# Patient Record
Sex: FEMALE | Race: WHITE | NOT HISPANIC OR LATINO | Employment: PART TIME | URBAN - METROPOLITAN AREA
[De-identification: names, ages, dates, MRNs, and addresses within clinical notes are randomized per-mention and may not be internally consistent; named-entity substitution may affect disease eponyms.]

---

## 2018-09-17 ENCOUNTER — ANESTHESIA EVENT (OUTPATIENT)
Dept: SURGERY | Facility: CLINIC | Age: 48
End: 2018-09-17
Payer: COMMERCIAL

## 2018-09-17 ENCOUNTER — HOSPITAL ENCOUNTER (OUTPATIENT)
Facility: CLINIC | Age: 48
Discharge: HOME | End: 2018-09-17
Attending: ORTHOPAEDIC SURGERY | Admitting: ORTHOPAEDIC SURGERY
Payer: COMMERCIAL

## 2018-09-17 ENCOUNTER — ANESTHESIA (OUTPATIENT)
Dept: SURGERY | Facility: CLINIC | Age: 48
End: 2018-09-17
Payer: COMMERCIAL

## 2018-09-17 VITALS
SYSTOLIC BLOOD PRESSURE: 116 MMHG | DIASTOLIC BLOOD PRESSURE: 83 MMHG | BODY MASS INDEX: 36.71 KG/M2 | TEMPERATURE: 96.6 F | RESPIRATION RATE: 16 BRPM | OXYGEN SATURATION: 96 % | HEIGHT: 70 IN | WEIGHT: 256.4 LBS

## 2018-09-17 DIAGNOSIS — Z98.890 S/P FOOT SURGERY: Primary | ICD-10-CM

## 2018-09-17 LAB — HCG UR QL: NEGATIVE

## 2018-09-17 PROCEDURE — 71000012 ZZH RECOVERY PHASE 1 LEVEL 1 FIRST HR: Performed by: ORTHOPAEDIC SURGERY

## 2018-09-17 PROCEDURE — 25000128 H RX IP 250 OP 636: Performed by: ANESTHESIOLOGY

## 2018-09-17 PROCEDURE — 27210794 ZZH OR GENERAL SUPPLY STERILE: Performed by: ORTHOPAEDIC SURGERY

## 2018-09-17 PROCEDURE — 36000056 ZZH SURGERY LEVEL 3 1ST 30 MIN: Performed by: ORTHOPAEDIC SURGERY

## 2018-09-17 PROCEDURE — 27110028 ZZH OR GENERAL SUPPLY NON-STERILE: Performed by: ORTHOPAEDIC SURGERY

## 2018-09-17 PROCEDURE — 37000008 ZZH ANESTHESIA TECHNICAL FEE, 1ST 30 MIN: Performed by: ORTHOPAEDIC SURGERY

## 2018-09-17 PROCEDURE — 25000125 ZZHC RX 250: Performed by: NURSE ANESTHETIST, CERTIFIED REGISTERED

## 2018-09-17 PROCEDURE — 71000013 ZZH RECOVERY PHASE 1 LEVEL 1 EA ADDTL HR: Performed by: ORTHOPAEDIC SURGERY

## 2018-09-17 PROCEDURE — 25800025 ZZH RX 258: Performed by: ORTHOPAEDIC SURGERY

## 2018-09-17 PROCEDURE — C1713 ANCHOR/SCREW BN/BN,TIS/BN: HCPCS | Performed by: ORTHOPAEDIC SURGERY

## 2018-09-17 PROCEDURE — 25000128 H RX IP 250 OP 636: Performed by: NURSE ANESTHETIST, CERTIFIED REGISTERED

## 2018-09-17 PROCEDURE — 37000009 ZZH ANESTHESIA TECHNICAL FEE, EACH ADDTL 15 MIN: Performed by: ORTHOPAEDIC SURGERY

## 2018-09-17 PROCEDURE — 36000058 ZZH SURGERY LEVEL 3 EA 15 ADDTL MIN: Performed by: ORTHOPAEDIC SURGERY

## 2018-09-17 PROCEDURE — 25000566 ZZH SEVOFLURANE, EA 15 MIN: Performed by: ORTHOPAEDIC SURGERY

## 2018-09-17 PROCEDURE — 71000027 ZZH RECOVERY PHASE 2 EACH 15 MINS: Performed by: ORTHOPAEDIC SURGERY

## 2018-09-17 PROCEDURE — 25000128 H RX IP 250 OP 636: Performed by: PHYSICIAN ASSISTANT

## 2018-09-17 PROCEDURE — 40000170 ZZH STATISTIC PRE-PROCEDURE ASSESSMENT II: Performed by: ORTHOPAEDIC SURGERY

## 2018-09-17 PROCEDURE — 25000132 ZZH RX MED GY IP 250 OP 250 PS 637: Performed by: PHYSICIAN ASSISTANT

## 2018-09-17 PROCEDURE — 25000125 ZZHC RX 250: Performed by: ORTHOPAEDIC SURGERY

## 2018-09-17 PROCEDURE — 25000128 H RX IP 250 OP 636: Performed by: ORTHOPAEDIC SURGERY

## 2018-09-17 PROCEDURE — 81025 URINE PREGNANCY TEST: CPT | Performed by: ANESTHESIOLOGY

## 2018-09-17 DEVICE — IMP KIT REPAIR LIGAMENT AUGMENTATION INT BRACE AR-1678-CP: Type: IMPLANTABLE DEVICE | Site: ANKLE | Status: FUNCTIONAL

## 2018-09-17 RX ORDER — ONDANSETRON 4 MG/1
4 TABLET, ORALLY DISINTEGRATING ORAL
Status: DISCONTINUED | OUTPATIENT
Start: 2018-09-17 | End: 2018-09-17 | Stop reason: HOSPADM

## 2018-09-17 RX ORDER — FENTANYL CITRATE 50 UG/ML
INJECTION, SOLUTION INTRAMUSCULAR; INTRAVENOUS PRN
Status: DISCONTINUED | OUTPATIENT
Start: 2018-09-17 | End: 2018-09-17

## 2018-09-17 RX ORDER — FENTANYL CITRATE 50 UG/ML
25-50 INJECTION, SOLUTION INTRAMUSCULAR; INTRAVENOUS
Status: DISCONTINUED | OUTPATIENT
Start: 2018-09-17 | End: 2018-09-17 | Stop reason: HOSPADM

## 2018-09-17 RX ORDER — NALOXONE HYDROCHLORIDE 0.4 MG/ML
.1-.4 INJECTION, SOLUTION INTRAMUSCULAR; INTRAVENOUS; SUBCUTANEOUS
Status: DISCONTINUED | OUTPATIENT
Start: 2018-09-17 | End: 2018-09-17 | Stop reason: HOSPADM

## 2018-09-17 RX ORDER — ROPIVACAINE HYDROCHLORIDE 5 MG/ML
INJECTION, SOLUTION EPIDURAL; INFILTRATION; PERINEURAL PRN
Status: DISCONTINUED | OUTPATIENT
Start: 2018-09-17 | End: 2018-09-17 | Stop reason: HOSPADM

## 2018-09-17 RX ORDER — MEPERIDINE HYDROCHLORIDE 25 MG/ML
12.5 INJECTION INTRAMUSCULAR; INTRAVENOUS; SUBCUTANEOUS
Status: DISCONTINUED | OUTPATIENT
Start: 2018-09-17 | End: 2018-09-17 | Stop reason: HOSPADM

## 2018-09-17 RX ORDER — PROPOFOL 10 MG/ML
INJECTION, EMULSION INTRAVENOUS PRN
Status: DISCONTINUED | OUTPATIENT
Start: 2018-09-17 | End: 2018-09-17

## 2018-09-17 RX ORDER — AMOXICILLIN 250 MG
1-2 CAPSULE ORAL 2 TIMES DAILY
Qty: 30 TABLET | Refills: 0 | Status: SHIPPED | OUTPATIENT
Start: 2018-09-17 | End: 2022-09-26

## 2018-09-17 RX ORDER — OXYCODONE AND ACETAMINOPHEN 5; 325 MG/1; MG/1
1 TABLET ORAL
Status: COMPLETED | OUTPATIENT
Start: 2018-09-17 | End: 2018-09-17

## 2018-09-17 RX ORDER — OXYCODONE AND ACETAMINOPHEN 5; 325 MG/1; MG/1
1-2 TABLET ORAL EVERY 4 HOURS PRN
Qty: 20 TABLET | Refills: 0 | Status: SHIPPED | OUTPATIENT
Start: 2018-09-17 | End: 2022-09-26

## 2018-09-17 RX ORDER — ONDANSETRON 4 MG/1
4 TABLET, ORALLY DISINTEGRATING ORAL EVERY 30 MIN PRN
Status: DISCONTINUED | OUTPATIENT
Start: 2018-09-17 | End: 2018-09-17 | Stop reason: HOSPADM

## 2018-09-17 RX ORDER — PROPOFOL 10 MG/ML
INJECTION, EMULSION INTRAVENOUS CONTINUOUS PRN
Status: DISCONTINUED | OUTPATIENT
Start: 2018-09-17 | End: 2018-09-17

## 2018-09-17 RX ORDER — CEFAZOLIN SODIUM 2 G/100ML
2 INJECTION, SOLUTION INTRAVENOUS
Status: COMPLETED | OUTPATIENT
Start: 2018-09-17 | End: 2018-09-17

## 2018-09-17 RX ORDER — SODIUM CHLORIDE, SODIUM LACTATE, POTASSIUM CHLORIDE, CALCIUM CHLORIDE 600; 310; 30; 20 MG/100ML; MG/100ML; MG/100ML; MG/100ML
INJECTION, SOLUTION INTRAVENOUS CONTINUOUS PRN
Status: DISCONTINUED | OUTPATIENT
Start: 2018-09-17 | End: 2018-09-17

## 2018-09-17 RX ORDER — LIDOCAINE HYDROCHLORIDE 20 MG/ML
INJECTION, SOLUTION INFILTRATION; PERINEURAL PRN
Status: DISCONTINUED | OUTPATIENT
Start: 2018-09-17 | End: 2018-09-17

## 2018-09-17 RX ORDER — SODIUM CHLORIDE, SODIUM LACTATE, POTASSIUM CHLORIDE, CALCIUM CHLORIDE 600; 310; 30; 20 MG/100ML; MG/100ML; MG/100ML; MG/100ML
INJECTION, SOLUTION INTRAVENOUS CONTINUOUS
Status: DISCONTINUED | OUTPATIENT
Start: 2018-09-17 | End: 2018-09-17 | Stop reason: HOSPADM

## 2018-09-17 RX ORDER — IBUPROFEN 600 MG/1
600 TABLET, FILM COATED ORAL EVERY 6 HOURS PRN
Qty: 30 TABLET | Refills: 0 | Status: SHIPPED | OUTPATIENT
Start: 2018-09-17 | End: 2022-09-26

## 2018-09-17 RX ORDER — ONDANSETRON 4 MG/1
4-8 TABLET, ORALLY DISINTEGRATING ORAL EVERY 6 HOURS PRN
Qty: 12 TABLET | Refills: 1 | Status: SHIPPED | OUTPATIENT
Start: 2018-09-17 | End: 2022-09-26

## 2018-09-17 RX ORDER — ONDANSETRON 2 MG/ML
INJECTION INTRAMUSCULAR; INTRAVENOUS PRN
Status: DISCONTINUED | OUTPATIENT
Start: 2018-09-17 | End: 2018-09-17

## 2018-09-17 RX ORDER — HYDROMORPHONE HYDROCHLORIDE 1 MG/ML
.3-.5 INJECTION, SOLUTION INTRAMUSCULAR; INTRAVENOUS; SUBCUTANEOUS EVERY 10 MIN PRN
Status: DISCONTINUED | OUTPATIENT
Start: 2018-09-17 | End: 2018-09-17 | Stop reason: HOSPADM

## 2018-09-17 RX ORDER — ONDANSETRON 2 MG/ML
4 INJECTION INTRAMUSCULAR; INTRAVENOUS EVERY 30 MIN PRN
Status: DISCONTINUED | OUTPATIENT
Start: 2018-09-17 | End: 2018-09-17 | Stop reason: HOSPADM

## 2018-09-17 RX ORDER — HYDROXYZINE HYDROCHLORIDE 25 MG/1
25 TABLET, FILM COATED ORAL EVERY 6 HOURS PRN
Qty: 30 TABLET | Refills: 0 | Status: SHIPPED | OUTPATIENT
Start: 2018-09-17 | End: 2022-09-26

## 2018-09-17 RX ORDER — CEFAZOLIN SODIUM 1 G/3ML
1 INJECTION, POWDER, FOR SOLUTION INTRAMUSCULAR; INTRAVENOUS SEE ADMIN INSTRUCTIONS
Status: DISCONTINUED | OUTPATIENT
Start: 2018-09-17 | End: 2018-09-17 | Stop reason: HOSPADM

## 2018-09-17 RX ORDER — MAGNESIUM HYDROXIDE 1200 MG/15ML
LIQUID ORAL PRN
Status: DISCONTINUED | OUTPATIENT
Start: 2018-09-17 | End: 2018-09-17 | Stop reason: HOSPADM

## 2018-09-17 RX ORDER — KETOROLAC TROMETHAMINE 30 MG/ML
INJECTION, SOLUTION INTRAMUSCULAR; INTRAVENOUS PRN
Status: DISCONTINUED | OUTPATIENT
Start: 2018-09-17 | End: 2018-09-17

## 2018-09-17 RX ORDER — DEXAMETHASONE SODIUM PHOSPHATE 4 MG/ML
INJECTION, SOLUTION INTRA-ARTICULAR; INTRALESIONAL; INTRAMUSCULAR; INTRAVENOUS; SOFT TISSUE PRN
Status: DISCONTINUED | OUTPATIENT
Start: 2018-09-17 | End: 2018-09-17

## 2018-09-17 RX ADMIN — FENTANYL CITRATE 50 MCG: 50 INJECTION, SOLUTION INTRAMUSCULAR; INTRAVENOUS at 13:24

## 2018-09-17 RX ADMIN — FENTANYL CITRATE 50 MCG: 50 INJECTION, SOLUTION INTRAMUSCULAR; INTRAVENOUS at 13:16

## 2018-09-17 RX ADMIN — CEFAZOLIN SODIUM 2 G: 2 INJECTION, SOLUTION INTRAVENOUS at 13:20

## 2018-09-17 RX ADMIN — PHENYLEPHRINE HYDROCHLORIDE 100 MCG: 10 INJECTION, SOLUTION INTRAMUSCULAR; INTRAVENOUS; SUBCUTANEOUS at 13:22

## 2018-09-17 RX ADMIN — FENTANYL CITRATE 50 MCG: 50 INJECTION INTRAMUSCULAR; INTRAVENOUS at 14:20

## 2018-09-17 RX ADMIN — PHENYLEPHRINE HYDROCHLORIDE 100 MCG: 10 INJECTION, SOLUTION INTRAMUSCULAR; INTRAVENOUS; SUBCUTANEOUS at 13:34

## 2018-09-17 RX ADMIN — FENTANYL CITRATE 50 MCG: 50 INJECTION INTRAMUSCULAR; INTRAVENOUS at 14:44

## 2018-09-17 RX ADMIN — PROPOFOL 150 MCG/KG/MIN: 10 INJECTION, EMULSION INTRAVENOUS at 13:18

## 2018-09-17 RX ADMIN — ONDANSETRON 4 MG: 2 INJECTION INTRAMUSCULAR; INTRAVENOUS at 13:26

## 2018-09-17 RX ADMIN — LIDOCAINE HYDROCHLORIDE 100 MG: 20 INJECTION, SOLUTION INFILTRATION; PERINEURAL at 13:16

## 2018-09-17 RX ADMIN — PROPOFOL 200 MG: 10 INJECTION, EMULSION INTRAVENOUS at 13:16

## 2018-09-17 RX ADMIN — KETOROLAC TROMETHAMINE 30 MG: 30 INJECTION, SOLUTION INTRAMUSCULAR at 13:48

## 2018-09-17 RX ADMIN — OXYCODONE HYDROCHLORIDE AND ACETAMINOPHEN 1 TABLET: 5; 325 TABLET ORAL at 15:14

## 2018-09-17 RX ADMIN — FENTANYL CITRATE 50 MCG: 50 INJECTION INTRAMUSCULAR; INTRAVENOUS at 14:11

## 2018-09-17 RX ADMIN — DEXAMETHASONE SODIUM PHOSPHATE 4 MG: 4 INJECTION, SOLUTION INTRA-ARTICULAR; INTRALESIONAL; INTRAMUSCULAR; INTRAVENOUS; SOFT TISSUE at 13:25

## 2018-09-17 RX ADMIN — SODIUM CHLORIDE, POTASSIUM CHLORIDE, SODIUM LACTATE AND CALCIUM CHLORIDE: 600; 310; 30; 20 INJECTION, SOLUTION INTRAVENOUS at 13:15

## 2018-09-17 RX ADMIN — MIDAZOLAM 2 MG: 1 INJECTION INTRAMUSCULAR; INTRAVENOUS at 13:15

## 2018-09-17 ASSESSMENT — ENCOUNTER SYMPTOMS: DYSRHYTHMIAS: 1

## 2018-09-17 NOTE — ANESTHESIA PREPROCEDURE EVALUATION
Anesthesia Evaluation     . Pt has had prior anesthetic.     History of anesthetic complications    succinylcholine as a teen--very prolonged wakeup      ROS/MED HX    ENT/Pulmonary:      (-) asthma and sleep apnea   Neurologic:     (+)migraines,     Cardiovascular:     (+) ----. : . . . :. dysrhythmias (ablation for a fib one year ago) a-fib, .      (-) dyslipidemia   METS/Exercise Tolerance:     Hematologic:         Musculoskeletal:         GI/Hepatic:        (-) GERD   Renal/Genitourinary:         Endo:     (+) Obesity, .      Psychiatric:         Infectious Disease:         Malignancy:         Other:                     Physical Exam  Normal systems: pulmonary    Airway   Mallampati: II  TM distance: >3 FB  Neck ROM: full    Dental   Comment: native    Cardiovascular   Rhythm and rate: regular and normal      Pulmonary                     Anesthesia Plan      History & Physical Review  History and physical reviewed and following examination; no interval change.    ASA Status:  2 .    NPO Status:  > 8 hours    Plan for General and LMA with Propofol induction. Maintenance will be Balanced.    PONV prophylaxis:  Ondansetron (or other 5HT-3) and Dexamethasone or Solumedrol       Postoperative Care      Consents  Anesthetic plan, risks, benefits and alternatives discussed with:  Patient..                          .

## 2018-09-17 NOTE — IP AVS SNAPSHOT
[facility]    [address]    [place] [address]    Phone:  [phone]    Fax:  [phone]                                       After Visit Summary   9/17/2018    Farideh Sr    MRN: [id number]           After Visit Summary Signature Page     I have received my discharge instructions, and my questions have been answered. I have discussed any challenges I see with this plan with the nurse or doctor.    ..........................................................................................................................................  Patient/Patient Representative Signature      ..........................................................................................................................................  Patient Representative Print Name and Relationship to Patient    ..................................................               ................................................  Date                                   Time    ..........................................................................................................................................  Reviewed by Signature/Title    ...................................................              ..............................................  Date                                               Time          22EPIC Rev 08/18

## 2018-09-17 NOTE — ANESTHESIA POSTPROCEDURE EVALUATION
Patient: Farideh Sr    Procedure(s):  RIGHT ANKLE ARTHROSCOPY DEBRIDEMENT AND OPEN BROSTROM AND INTERNAL BRACE  - Wound Class: I-Clean    Diagnosis:RIGHT ANKLE INSTABILITY  Diagnosis Additional Information: No value filed.    Anesthesia Type:  General, LMA    Note:  Anesthesia Post Evaluation    Patient location during evaluation: PACU  Patient participation: Able to fully participate in evaluation  Level of consciousness: awake  Pain management: adequate  Cardiovascular status: acceptable  Respiratory status: acceptable  Hydration status: acceptable  PONV: none             Last vitals:  Vitals:    09/17/18 1407 09/17/18 1414 09/17/18 1415   BP: 127/60 127/60 119/58   Resp: 8 15 12   Temp: 35.8  C (96.5  F) 35.9  C (96.6  F) 35.9  C (96.6  F)   SpO2: 97% 97% 99%         Electronically Signed By: AMPARO ADAM  September 17, 2018  2:52 PM

## 2018-09-17 NOTE — ANESTHESIA CARE TRANSFER NOTE
Patient: Farideh Sr    Procedure(s):  RIGHT ANKLE ARTHROSCOPY DEBRIDEMENT AND OPEN BROSTROM AND INTERNAL BRACE  - Wound Class: I-Clean    Diagnosis: RIGHT ANKLE INSTABILITY  Diagnosis Additional Information: No value filed.    Anesthesia Type:   General, LMA     Note:  Airway :Nasal Cannula  Patient transferred to:PACU  Comments: Pt exhibits spontaneous respirations, follows commands, suctioned, LMA removed, exchanging well, transferred to pacu with O2 @ 2L via NC, all monitors and alarms on, VSS, patent IV, report and transfer of care to RN.  Handoff Report: Identifed the Patient, Identified the Reponsible Provider, Reviewed the pertinent medical history, Discussed the surgical course, Reviewed Intra-OP anesthesia mangement and issues during anesthesia, Set expectations for post-procedure period and Allowed opportunity for questions and acknowledgement of understanding      Vitals: (Last set prior to Anesthesia Care Transfer)    CRNA VITALS  9/17/2018 1331 - 9/17/2018 1408      9/17/2018             Pulse: 89    SpO2: 99 %    Resp Rate (observed): (!)  6    Resp Rate (set): 10                Electronically Signed By: JOSE MIGUEL Dobson CRNA  September 17, 2018  2:08 PM

## 2018-09-17 NOTE — OP NOTE
Procedure Date: 09/17/2018      PREOPERATIVE DIAGNOSES:     1.  Chronic instability of the right ankle.   2.  Osteochondral injury, right ankle, secondary to the chronic instability.       PROCEDURE PERFORMED:    1. Arthroscopy and Arthroscopic debridement of the right ankle including removal bone spurs  2. Brostrom Lateral ligament reconstruction augmented with an Internal Brace     ANESTHESIA:  General.       SURGEON:  Tita Larios MD      ASSISTANT:  TI Kelley      PREAMBLE:  Ms. Sr presented with longstanding issues with right ankle instability.  She tried conservative management to no avail.  She has increasing degenerative changes of her ankle due to the instability.      Informed consent was obtained for above-mentioned procedure.      Two grams of Ancef was given prior to surgery.  There is no need for postoperative antibiotic prophylaxis.      DESCRIPTION OF PROCEDURE:  After adequate induction of a general anesthetic, the patient was positioned supine on the operating table.  The right leg was sterilely prepped and free draped in the usual fashion.  Tourniquet around the thigh was inflated to 300 mmHg.        A standard arthroscopy was done with medial and lateral portals.  The scope was first inserted through the medial portal.  There was a significant articular cartilage injury in her ankle, especially over the lateral talar dome due to the chronic instability.  She has osteophytes anterior to the ankle.      The osteophytes were removed.  Debridement was done using a motorized shaver.      Due to the lateral instability, there was loss of articular cartilage over the medial malleolus and medial side of the talus.  There were loose bodies in the deltoid ligament that were removed.      After an adequate debridement, the scope was removed.  This was followed by a 5 cm curvilinear incision lateral over the ankle.  The lateral ligament complex was exposed.  A Brostrom type repair was  done, shortening and imbricating the anterior talofibular and calcaneofibular ligaments.      The repair was augmented with an internal brace with SwiveLock anchors at the footprints of the ATF and the talus and the fibula.      A stable repair was obtained.  The tourniquet was deflated.  Hemostasis obtained.  The wounds closed in layers.  A sterile dressing and a light compressive bandage applied, followed by a short leg cast.  She tolerated the procedure well and was taken to the recovery room in satisfactory condition.        She can ambulate weightbearing as tolerated.  Sutures will be removed in 2 weeks.         COREEN MELTON MD             D: 2018   T: 2018   MT: VERONICA      Name:     DEVEN SUAREZ   MRN:      [id number]        Account:        [id number]   :      1970           Procedure Date: 2018      Document: [id number]

## 2018-09-17 NOTE — DISCHARGE INSTRUCTIONS
POST-OPERATIVE INSTRUCTIONS  FOOT AND ANKLE SURGERY  LANDRY Larios M.D.  Felipe Daniel P.A.-C    These precautions MUST be followed for the first 24 hours after surgery:    Upon discharge, go directly home.    You must make arrangements to have a responsible adult stay with you.    DO NOT DRINK ALCOHOLIC BEVERAGES    It is not unusual to feel lightheaded up to 24 hours after surgery or while taking pain medication.  If you feel lightheaded, sit for a few minutes before standing and have someone assisted you when you get up to walk or use the restroom.    Do not use any mechanical equipment.    Do not make any important or legal decisions for 24 hours or while on pain medications.    You may experience dry mouth, sore throat, or sleep disturbances from the anesthesia and medications used during surgery.  Generalized muscle aches can sometimes occur.  These usually disappear in 12-24 hours.    POST-OPERATIVE CARE GUIDELINES    The following are general guidelines about what to expect the first days/weeks after surgery.  They are not specific, and your recovery may be slightly different.    Blood clots are not common, but are emergencies.  If you have sudden onset pain in your calf or leg, or have sudden shortness of breath, go to the Emergency Department.      Elevation of your operative foot/ankle is key to reducing the swelling in the immediate post-operative phase (first 3-5 days).  When you are at rest, elevate your foot at or above the level of your heart.  When sitting, your foot should be elevated on a chair or stool; not hanging down.      You should plan to rest the day after surgery no matter how minor the procedure.  More complicated procedures will require more time to return to normal activity.      Foot and ankle surgery is painful in most cases - use your medication as directed for the first 24 hours after surgery.  It is not unusual for the pain to be worse a day or two after surgery than on the  day of.  If your pain is more than 8/10 contact our office.  If you don t have pain, gradually decrease your pain meds by substituting Tylenol.  Please don t use Advil/ibuprofen unless we order it for you.      You will be prescribed Percocet or Vicodin for pain, Vistaril for spasms/pain adjunct, Senokot for constipation.  If you have had trouble taking these meds before or experience nausea or vomiting after surgery from your medication, please advise the recovery room nurses.  If you are already at home, try drinking only clear liquids and/or call our office.      All pain medications, along with inactivity can cause constipation.  Use the Senakot as directed, increase fluid intake to 1 quart per day and increase your dietary fiber.  (The  P  fruits - peaches, plums, pears, and prunes as well as anise/black licorice are recommended.)    It is not unusual to run a low-grade fever after surgery.  If your temperature is elevated above 102 , lasts longer than 24 hours, or is questionable in any way, contact our office.      Drainage from your cast/dressing is normal.  Reinforce your cast/dressing with 4x4 dressings and cover with an Ace wrap.  Wait until 24 hours after surgery to change your dressings; by this time most of your bleeding will have stopped.  If you have drainage through your dressings 2 days after surgery, contact our office.      You cast/dressing will be changed at your 2-week follow up appointment.  They should be kept clean and DRY.  If your cast/dressing is damaged before that, contact our office.      It is normal to experience some bruising in the toes after surgery and they may appear  dark  when your foot hangs down.  It is important to actively wiggle your toes for at least 5-10 minutes each hour UNLESS you had surgery on those toes.      Use ice on your foot/ankle over the dressing/cast for 20 minutes per hour, 10 or more times per day.  A large bag of frozen peas works well.      Bathing:  take a tub or sponge bath instead of a shower if possible during the first two weeks.  If you choose to shower, cover the dressing/cast with a waterproof covering, these may be ordered from www.seal-tight.com or are available at some pharmacies/medical supply stores.  Another option is XeroSox, which is the original vacuum sealed bandage and cast cover.  The cast cover has a vacuum seal and is absolutely waterproof.  1-760.952.4182 or www.xerosox.com for more information.      Driving:  For surgery on the left foot/ankle, you may drive as soon as narcotic medications are stopped.  For surgery on the right foot/ankle, you may drive when you are out of a cast, off pain medications, and you feel secure with braking.      In general, listen to your foot/ankle.  If you have a sudden, dramatic increase in pain that does not resolve after an hour or so, something is wrong - call our office.  If you fall or bump your foot/ankle and have sudden pain that resolves, give it 24 hours before you call.      Many of your questions can be addressed at your 2-week follow-up appointment - please make a list of things to ask us as they come up during your recovery.      If you had a nerve block it is common to have numbness in your leg and foot for up to 30 hours after surgery.  If your leg or foot is still numb more than 30 hours after surgery, please call the office.      FUTURE DENTIST VISITS:  If you have had a total ankle arthroplasty please be advised you must be on antibiotics prior to ANY dental work.  Please call your dentist office ahead of time and make them aware of this as your dentist will be able to order the prescription.  If you have had any other surgery this is not a concern.    If you have any further questions or concerns, please call our office at [phone]      Same Day Surgery Discharge Instructions for  Sedation and General Anesthesia       It's not unusual to feel dizzy, light-headed or faint for up to  24 hours after surgery or while taking pain medication.  If you have these symptoms: sit for a few minutes before standing and have someone assist you when you get up to walk or use the bathroom.      You should rest and relax for the next 24 hours. We recommend you make arrangements to have an adult stay with you for at least 24 hours after your discharge.  Avoid hazardous and strenuous activity.      DO NOT DRIVE any vehicle or operate mechanical equipment for 24 hours following the end of your surgery.  Even though you may feel normal, your reactions may be affected by the medication you have received.      Do not drink alcoholic beverages for 24 hours following surgery.       Slowly progress to your regular diet as you feel able. It's not unusual to feel nauseated and/or vomit after receiving anesthesia.  If you develop these symptoms, drink clear liquids (apple juice, ginger ale, broth, 7-up, etc. ) until you feel better.  If your nausea and vomiting persists for 24 hours, please notify your surgeon.        All narcotic pain medications, along with inactivity and anesthesia, can cause constipation. Drinking plenty of liquids and increasing fiber intake will help.      For any questions of a medical nature, call your surgeon.      Do not make important decisions for 24 hours.      If you had general anesthesia, you may have a sore throat for a couple of days related to the breathing tube used during surgery.  You may use Cepacol lozenges to help with this discomfort.  If it worsens or if you develop a fever, contact your surgeon.       If you feel your pain is not well managed with the pain medications prescribed by your surgeon, please contact your surgeon's office to let them know so they can address your concerns.     Crutch Instructions      Because of your surgery you will need crutches.  Make sure you tell your healthcare provider if crutches do not seem to work for you.  Using Crutches   Crutches are the  "most commonly used device for injuries to the lower part of the body because they allow the most mobility. All walking assistance devices require strength in your upper body but crutches require more coordination. If you are unable to use crutches then a cane or walker may be better.   Remember these rules when using crutches:   Always look straight ahead when you walk. Do not look down.   Hold the top padded part of the crutches into your chest near your armpits. Grasp the padded hand  and always support your weight with your arms and hands.   Do not lean on the crutches with your armpit as this could cause nerve damage.  Standing Up  Make sure you are in a stable chair. Move forward to the edge of the chair so that your  good  foot is flat on the floor. Put both crutches together and hold the handgrips in one hand. Stretch the injured leg out straight and then use the crutches with one hand and the chair armrest with the other hand to push yourself into a standing position onto your  good  leg. Once you are standing, wait until you are steady before placing a crutch in each hand. Until you become good at standing, have someone assist you in case you lose your balance. When standing still, lean slightly forward with the crutches ahead of you and about 3 feet apart. Unless you are told otherwise, you should keep weight off your injured leg.  Walking  To begin crutch walking, balance yourself on your  good  leg. Move both crutches forward about the length of one step and place them firmly on the floor in front of you about 3 feet apart. Support your weight on the padded hand rests while leaning forward. Press the top of the crutches against your chest wall and not into your armpits. Be sure to hold the injured leg up off of the floor. When ready, swing the \"good\" leg forward about one step length past the crutches while supporting your weight on the padded hand . Be careful not to go too far. Transfer your " "weight onto the \"good\" leg then bring both crutches forward about the length of one step. Repeating this motion will allow you to move fairly quickly without the use of your injured leg. Keep practicing until you become good at crutch walking.  Sitting Down  Make sure the chair you are about to sit on is stable. Walk in front of the chair such that you are facing away from it. Move back a little at a time until the back of your  good  leg is nearly touching the seat. Keeping your weight on the  good  leg, move both crutches to one hand holding onto the handgrips. Lean forward, bend your  good  knee, and move your injured leg out forward. Sit down slowly while using your free hand to reach for the chair s armrest for support. Place the crutches where you can easily get them. Never pivot, even on your  good  leg. This could cause you to fall or injure your  good  leg.  Navigating Stairs, Curbs & Door Steps  Only attempt this once you are very comfortable with regular crutch walking and only when someone is there to steady you should you begin to lose your balance. Hold on to a  railing with one hand and both crutches in the other hand or have someone carry the loose crutch for you. It does not matter which side the handrail is on. If there is no handrail, you can use both crutches. Using only crutches is the same as the railing method but maintaining your balance can be difficult. The crutch-only method is not recommended until you have become very good at crutch walking. Be sure to only take one step at a time. A simple rule to remember for crutches when navigating stairs or curbs: up with the  good  leg and down with the  bad .  Going Up Stairs or Curbs  Walk close to the first step with the crutches slightly behind you. Push down on the handrail and the crutch and step up with the \"good\" leg. You may have to make a short hop to do this if you are not allowed to place any weight on the injured leg. Lean " "forward and bring the injured leg and the crutch up beside the \"good\" leg. Repeat this until you have climbed up all of the steps. Remember, the \"good\" leg goes up first and the crutches move with the injured leg. The person helping you should stand behind and to your side that is away from the railing.  Going Down Stairs or Curbs  Walk to the edge of the first step. While standing and balancing on the  good  leg, place both crutches in one hand and then down on the step below. Be sure to support your weight by leaning on the crutches and handrail. Bring the injured leg forward, then the \"good\" leg down to the same step as the crutches. Remember crutches go down first with the injured leg. The person helping you should  front and to your side that is away from the railing.  Sitting Method for Navigating Stairs  A safer way to get up and down stairs is to sit down. To go up steps, sit down on the second or third step. Push with your  good  leg below while pushing up with both arms on the step above to move your bottom to the next step. When you get to the top of the stairs you may need to use the  scooting  method described later to get back up. To go down steps you first need to lower yourself to the floor near the top of the steps. Once seated, support yourself with your  good  leg on the second to next step below, and push with both arms on the step where you are sitting to move your bottom down to the next step. When you reach the bottom, pull yourself up to standing position using your crutches. It is helpful if someone can move your crutches and assist you in getting up and down. With practice it may be possible to manage on your own.  If You Start To Fall  If you start to fall and cannot get your balance, throw the crutches away from you. Try to fall onto your  good  side away from your injury and then break your fall with your arms. If uninjured, you can usually get back up by moving into a sitting " "position and scooting to a chair. Push with your arms and hands on the chair seat while pushing with the \"good\" leg to get up into the chair. You should not attempt to get back up if you are having significant pain. Call for assistance or dial 911 for an ambulance if necessary.  Safety Tips for Crutch Walking   At first you may want to wear a training belt (or a strong regular belt) so others can assist you.   Do not use crutches if you feel dizzy or drowsy.   Be careful on slick or wet surfaces like a kitchen or bathroom floor, snow, ice, or rainy conditions.   Temporarily remove pets, throw rugs or other items from your home that might trip you.   Do not hop around while holding onto furniture.   Wear sneakers or rubber soled shoes that will not easily slip or come off.   Be careful on ramps or slopes as they can be harder to walk up or down   Do not remove any parts from your crutches especially the padding or rubber traction footings. Replace padding or footings that become damaged immediately.   It is important to use your crutches correctly. If you feel any numbness or tingling in your arms, you are probably using the crutches incorrectly.  Helpful Hints   A bedside toilet or toilet riser may be helpful.   Always allow for extra time to get around. Children in school should be given permission to leave class early to avoid crowds when changing classes.   Elevate the injured leg when sitting.   Use a backpack to carry books or waist pouch to carry other items so that both hands are free.   Call your healthcare provider if you have any questions or concerns.          "

## 2018-09-17 NOTE — IP AVS SNAPSHOT
MRN:[id number]                      After Visit Summary   9/17/2018    Farideh Sr    MRN: [id number]           Thank you!     Thank you for choosing [facility] for your care. Our goal is always to provide you with excellent care. Hearing back from our patients is one way we can continue to improve our services. Please take a few minutes to complete the written survey that you may receive in the mail after you visit with us. Thank you!        Patient Information     Date Of Birth          1970        About your hospital stay     You were admitted on:  September 17, 2018 You last received care in the:  [facility] Same Day Surgery    You were discharged on:  September 17, 2018       Who to Call     For medical emergencies, please call 911.  For non-urgent questions about your medical care, please call your primary care provider or clinic, None  For questions related to your surgery, please call your surgery clinic        Attending Provider     Provider Tita Pichardo MD Orthopaedic Surgery       Primary Care Provider Fax #    Physician No Ref-Primary [phone]      After Care Instructions     Discharge Instructions       Review discharge instructions as directed by Provider.            Discharge Instructions       Patient to arrange for return to clinic appointment in two weeks.            Elevate affected extremity           Ice to affected area       Ice pack to affected area PRN (as needed).            No dressing change       until follow up clinic appointment.            No driving or operating machinery       until the day after procedure            Weight bearing status - As tolerated                 Further instructions from your care team       POST-OPERATIVE INSTRUCTIONS  FOOT AND ANKLE SURGERY  LANDRY Larios M.D.  Felipe Daniel P.A.-C    These precautions MUST be followed for the first 24 hours after surgery:    Upon discharge, go directly  home.    You must make arrangements to have a responsible adult stay with you.    DO NOT DRINK ALCOHOLIC BEVERAGES    It is not unusual to feel lightheaded up to 24 hours after surgery or while taking pain medication.  If you feel lightheaded, sit for a few minutes before standing and have someone assisted you when you get up to walk or use the restroom.    Do not use any mechanical equipment.    Do not make any important or legal decisions for 24 hours or while on pain medications.    You may experience dry mouth, sore throat, or sleep disturbances from the anesthesia and medications used during surgery.  Generalized muscle aches can sometimes occur.  These usually disappear in 12-24 hours.    POST-OPERATIVE CARE GUIDELINES    The following are general guidelines about what to expect the first days/weeks after surgery.  They are not specific, and your recovery may be slightly different.    Blood clots are not common, but are emergencies.  If you have sudden onset pain in your calf or leg, or have sudden shortness of breath, go to the Emergency Department.      Elevation of your operative foot/ankle is key to reducing the swelling in the immediate post-operative phase (first 3-5 days).  When you are at rest, elevate your foot at or above the level of your heart.  When sitting, your foot should be elevated on a chair or stool; not hanging down.      You should plan to rest the day after surgery no matter how minor the procedure.  More complicated procedures will require more time to return to normal activity.      Foot and ankle surgery is painful in most cases - use your medication as directed for the first 24 hours after surgery.  It is not unusual for the pain to be worse a day or two after surgery than on the day of.  If your pain is more than 8/10 contact our office.  If you don t have pain, gradually decrease your pain meds by substituting Tylenol.  Please don t use Advil/ibuprofen unless we order it for  you.      You will be prescribed Percocet or Vicodin for pain, Vistaril for spasms/pain adjunct, Senokot for constipation.  If you have had trouble taking these meds before or experience nausea or vomiting after surgery from your medication, please advise the recovery room nurses.  If you are already at home, try drinking only clear liquids and/or call our office.      All pain medications, along with inactivity can cause constipation.  Use the Senakot as directed, increase fluid intake to 1 quart per day and increase your dietary fiber.  (The  P  fruits - peaches, plums, pears, and prunes as well as anise/black licorice are recommended.)    It is not unusual to run a low-grade fever after surgery.  If your temperature is elevated above 102 , lasts longer than 24 hours, or is questionable in any way, contact our office.      Drainage from your cast/dressing is normal.  Reinforce your cast/dressing with 4x4 dressings and cover with an Ace wrap.  Wait until 24 hours after surgery to change your dressings; by this time most of your bleeding will have stopped.  If you have drainage through your dressings 2 days after surgery, contact our office.      You cast/dressing will be changed at your 2-week follow up appointment.  They should be kept clean and DRY.  If your cast/dressing is damaged before that, contact our office.      It is normal to experience some bruising in the toes after surgery and they may appear  dark  when your foot hangs down.  It is important to actively wiggle your toes for at least 5-10 minutes each hour UNLESS you had surgery on those toes.      Use ice on your foot/ankle over the dressing/cast for 20 minutes per hour, 10 or more times per day.  A large bag of frozen peas works well.      Bathing: take a tub or sponge bath instead of a shower if possible during the first two weeks.  If you choose to shower, cover the dressing/cast with a waterproof covering, these may be ordered from  www.seal-tight.com or are available at some pharmacies/medical supply stores.  Another option is XeroSox, which is the original vacuum sealed bandage and cast cover.  The cast cover has a vacuum seal and is absolutely waterproof.  5-119-897-8502 or www.xerosox.[employer] for more information.      Driving:  For surgery on the left foot/ankle, you may drive as soon as narcotic medications are stopped.  For surgery on the right foot/ankle, you may drive when you are out of a cast, off pain medications, and you feel secure with braking.      In general, listen to your foot/ankle.  If you have a sudden, dramatic increase in pain that does not resolve after an hour or so, something is wrong - call our office.  If you fall or bump your foot/ankle and have sudden pain that resolves, give it 24 hours before you call.      Many of your questions can be addressed at your 2-week follow-up appointment - please make a list of things to ask us as they come up during your recovery.      If you had a nerve block it is common to have numbness in your leg and foot for up to 30 hours after surgery.  If your leg or foot is still numb more than 30 hours after surgery, please call the office.      FUTURE DENTIST VISITS:  If you have had a total ankle arthroplasty please be advised you must be on antibiotics prior to ANY dental work.  Please call your dentist office ahead of time and make them aware of this as your dentist will be able to order the prescription.  If you have had any other surgery this is not a concern.    If you have any further questions or concerns, please call our office at [phone]      Same Day Surgery Discharge Instructions for  Sedation and General Anesthesia       It's not unusual to feel dizzy, light-headed or faint for up to 24 hours after surgery or while taking pain medication.  If you have these symptoms: sit for a few minutes before standing and have someone assist you when you get up to walk or use the  bathroom.      You should rest and relax for the next 24 hours. We recommend you make arrangements to have an adult stay with you for at least 24 hours after your discharge.  Avoid hazardous and strenuous activity.      DO NOT DRIVE any vehicle or operate mechanical equipment for 24 hours following the end of your surgery.  Even though you may feel normal, your reactions may be affected by the medication you have received.      Do not drink alcoholic beverages for 24 hours following surgery.       Slowly progress to your regular diet as you feel able. It's not unusual to feel nauseated and/or vomit after receiving anesthesia.  If you develop these symptoms, drink clear liquids (apple juice, ginger ale, broth, 7-up, etc. ) until you feel better.  If your nausea and vomiting persists for 24 hours, please notify your surgeon.        All narcotic pain medications, along with inactivity and anesthesia, can cause constipation. Drinking plenty of liquids and increasing fiber intake will help.      For any questions of a medical nature, call your surgeon.      Do not make important decisions for 24 hours.      If you had general anesthesia, you may have a sore throat for a couple of days related to the breathing tube used during surgery.  You may use Cepacol lozenges to help with this discomfort.  If it worsens or if you develop a fever, contact your surgeon.       If you feel your pain is not well managed with the pain medications prescribed by your surgeon, please contact your surgeon's office to let them know so they can address your concerns.     Crutch Instructions      Because of your surgery you will need crutches.  Make sure you tell your healthcare provider if crutches do not seem to work for you.  Using Crutches   Crutches are the most commonly used device for injuries to the lower part of the body because they allow the most mobility. All walking assistance devices require strength in your upper body but crutches  "require more coordination. If you are unable to use crutches then a cane or walker may be better.   Remember these rules when using crutches:   Always look straight ahead when you walk. Do not look down.   Hold the top padded part of the crutches into your chest near your armpits. Grasp the padded hand  and always support your weight with your arms and hands.   Do not lean on the crutches with your armpit as this could cause nerve damage.  Standing Up  Make sure you are in a stable chair. Move forward to the edge of the chair so that your  good  foot is flat on the floor. Put both crutches together and hold the handgrips in one hand. Stretch the injured leg out straight and then use the crutches with one hand and the chair armrest with the other hand to push yourself into a standing position onto your  good  leg. Once you are standing, wait until you are steady before placing a crutch in each hand. Until you become good at standing, have someone assist you in case you lose your balance. When standing still, lean slightly forward with the crutches ahead of you and about 3 feet apart. Unless you are told otherwise, you should keep weight off your injured leg.  Walking  To begin crutch walking, balance yourself on your  good  leg. Move both crutches forward about the length of one step and place them firmly on the floor in front of you about 3 feet apart. Support your weight on the padded hand rests while leaning forward. Press the top of the crutches against your chest wall and not into your armpits. Be sure to hold the injured leg up off of the floor. When ready, swing the \"good\" leg forward about one step length past the crutches while supporting your weight on the padded hand . Be careful not to go too far. Transfer your weight onto the \"good\" leg then bring both crutches forward about the length of one step. Repeating this motion will allow you to move fairly quickly without the use of your injured leg. " "Keep practicing until you become good at crutch walking.  Sitting Down  Make sure the chair you are about to sit on is stable. Walk in front of the chair such that you are facing away from it. Move back a little at a time until the back of your  good  leg is nearly touching the seat. Keeping your weight on the  good  leg, move both crutches to one hand holding onto the handgrips. Lean forward, bend your  good  knee, and move your injured leg out forward. Sit down slowly while using your free hand to reach for the chair s armrest for support. Place the crutches where you can easily get them. Never pivot, even on your  good  leg. This could cause you to fall or injure your  good  leg.  Navigating Stairs, Curbs & Door Steps  Only attempt this once you are very comfortable with regular crutch walking and only when someone is there to steady you should you begin to lose your balance. Hold on to a  railing with one hand and both crutches in the other hand or have someone carry the loose crutch for you. It does not matter which side the handrail is on. If there is no handrail, you can use both crutches. Using only crutches is the same as the railing method but maintaining your balance can be difficult. The crutch-only method is not recommended until you have become very good at crutch walking. Be sure to only take one step at a time. A simple rule to remember for crutches when navigating stairs or curbs: up with the  good  leg and down with the  bad .  Going Up Stairs or Curbs  Walk close to the first step with the crutches slightly behind you. Push down on the handrail and the crutch and step up with the \"good\" leg. You may have to make a short hop to do this if you are not allowed to place any weight on the injured leg. Lean forward and bring the injured leg and the crutch up beside the \"good\" leg. Repeat this until you have climbed up all of the steps. Remember, the \"good\" leg goes up first and the crutches " "move with the injured leg. The person helping you should stand behind and to your side that is away from the railing.  Going Down Stairs or Curbs  Walk to the edge of the first step. While standing and balancing on the  good  leg, place both crutches in one hand and then down on the step below. Be sure to support your weight by leaning on the crutches and handrail. Bring the injured leg forward, then the \"good\" leg down to the same step as the crutches. Remember crutches go down first with the injured leg. The person helping you should  front and to your side that is away from the railing.  Sitting Method for Navigating Stairs  A safer way to get up and down stairs is to sit down. To go up steps, sit down on the second or third step. Push with your  good  leg below while pushing up with both arms on the step above to move your bottom to the next step. When you get to the top of the stairs you may need to use the  scooting  method described later to get back up. To go down steps you first need to lower yourself to the floor near the top of the steps. Once seated, support yourself with your  good  leg on the second to next step below, and push with both arms on the step where you are sitting to move your bottom down to the next step. When you reach the bottom, pull yourself up to standing position using your crutches. It is helpful if someone can move your crutches and assist you in getting up and down. With practice it may be possible to manage on your own.  If You Start To Fall  If you start to fall and cannot get your balance, throw the crutches away from you. Try to fall onto your  good  side away from your injury and then break your fall with your arms. If uninjured, you can usually get back up by moving into a sitting position and scooting to a chair. Push with your arms and hands on the chair seat while pushing with the \"good\" leg to get up into the chair. You should not attempt to get back up if you " are having significant pain. Call for assistance or dial 911 for an ambulance if necessary.  Safety Tips for Crutch Walking   At first you may want to wear a training belt (or a strong regular belt) so others can assist you.   Do not use crutches if you feel dizzy or drowsy.   Be careful on slick or wet surfaces like a kitchen or bathroom floor, snow, ice, or rainy conditions.   Temporarily remove pets, throw rugs or other items from your home that might trip you.   Do not hop around while holding onto furniture.   Wear sneakers or rubber soled shoes that will not easily slip or come off.   Be careful on ramps or slopes as they can be harder to walk up or down   Do not remove any parts from your crutches especially the padding or rubber traction footings. Replace padding or footings that become damaged immediately.   It is important to use your crutches correctly. If you feel any numbness or tingling in your arms, you are probably using the crutches incorrectly.  Helpful Hints   A bedside toilet or toilet riser may be helpful.   Always allow for extra time to get around. Children in school should be given permission to leave class early to avoid crowds when changing classes.   Elevate the injured leg when sitting.   Use a backpack to carry books or waist pouch to carry other items so that both hands are free.   Call your healthcare provider if you have any questions or concerns.            Pending Results     No orders found from 9/15/2018 to 9/18/2018.            Statement of Approval     Ordered          09/17/18 1301  I have reviewed and agree with all the recommendations and orders detailed in this document.  EFFECTIVE NOW     Approved and electronically signed by:  Luke Daniel PA-C             Admission Information     Date & Time Provider Department Dept. Phone    9/17/2018 Tita Larios MD [facility] Same Day Surgery [phone]      Your Vitals Were     Blood Pressure Temperature  "Respirations Height Weight Last Period    119/58 96.6  F (35.9  C) 12 1.778 m (5' 10\") 116.3 kg (256 lb 6.4 oz) 09/10/2018 (Approximate)    Pulse Oximetry BMI (Body Mass Index)                99% 36.79 kg/m2          [employer] Information     [employer] lets you send messages to your doctor, view your test results, renew your prescriptions, schedule appointments and more. To sign up, go to [email or web address] . Click on \"Log in\" on the left side of the screen, which will take you to the Welcome page. Then click on \"Sign up Now\" on the right side of the page.     You will be asked to enter the access code listed below, as well as some personal information. Please follow the directions to create your username and password.     Your access code is: [email or web address]  Expires: 2018  2:38 PM     Your access code will  in 90 days. If you need help or a new code, please call your [facility] clinic or [phone].        Care EveryWhere ID     This is your Care EveryWhere ID. This could be used by other organizations to access your [facility] medical records  [id number]        Equal Access to Services     LANI BURKS AH: Clive Sandoval, waaxda luqadaha, qaybta kaalmada adeegyada, herve canas. So [facility] [phone].    ATENCIÓN: Si habla español, tiene a jarvis disposición servicios gratuitos de asistencia lingüística. Llame al [phone].    We comply with applicable federal civil rights laws and [place] laws. We do not discriminate on the basis of race, color, national origin, age, disability, sex, sexual orientation, or gender identity.               Review of your medicines      START taking        Dose / Directions    hydrOXYzine 25 MG tablet   Commonly known as:  ATARAX   Used for:  S/P foot surgery        Dose:  25 mg   Take 1 tablet (25 mg) by mouth every 6 hours as needed for itching (and nausea)   Quantity:  30 tablet   Refills:  0       ibuprofen 600 MG tablet "   Commonly known as:  ADVIL/MOTRIN   Used for:  S/P foot surgery        Dose:  600 mg   Take 1 tablet (600 mg) by mouth every 6 hours as needed for pain (mild)   Quantity:  30 tablet   Refills:  0       ondansetron 4 MG ODT tab   Commonly known as:  ZOFRAN-ODT   Used for:  S/P foot surgery        Dose:  4-8 mg   Take 1-2 tablets (4-8 mg) by mouth every 6 hours as needed for nausea Dissolve ON the tongue.   Quantity:  12 tablet   Refills:  1       oxyCODONE-acetaminophen 5-325 MG per tablet   Commonly known as:  PERCOCET   Used for:  S/P foot surgery        Dose:  1-2 tablet   Take 1-2 tablets by mouth every 4 hours as needed for pain (moderate to severe)   Quantity:  20 tablet   Refills:  0       senna-docusate 8.6-50 MG per tablet   Commonly known as:  SENOKOT-S;PERICOLACE   Used for:  S/P foot surgery        Dose:  1-2 tablet   Take 1-2 tablets by mouth 2 times daily Take while on oral narcotics to prevent or treat constipation.   Quantity:  30 tablet   Refills:  0         CONTINUE these medicines which have NOT CHANGED        Dose / Directions    FUROSEMIDE PO        Dose:  20 mg   Take 20 mg by mouth every morning   Refills:  0       TOPROL XL PO        Dose:  25 mg   Take 25 mg by mouth 2 times daily   Refills:  0       VITAMIN B 12 PO        Dose:  1000 mcg   Take 1,000 mcg by mouth daily   Refills:  0            Where to get your medicines      These medications were sent to [facility] - [address]  [address], [place] [address]     Phone:  [phone]     hydrOXYzine 25 MG tablet    ibuprofen 600 MG tablet    ondansetron 4 MG ODT tab    senna-docusate 8.6-50 MG per tablet         Some of these will need a paper prescription and others can be bought over the counter. Ask your nurse if you have questions.     Bring a paper prescription for each of these medications     oxyCODONE-acetaminophen 5-325 MG per tablet                Protect others around you: Learn  how to safely use, store and throw away your medicines at www.disposemymeds.org.        Information about OPIOIDS     PRESCRIPTION OPIOIDS: WHAT YOU NEED TO KNOW   We gave you an opioid (narcotic) pain medicine. It is important to manage your pain, but opioids are not always the best choice. You should first try all the other options your care team gave you. Take this medicine for as short a time (and as few doses) as possible.    Some activities can increase your pain, such as bandage changes or therapy sessions. It may help to take your pain medicine 30 to 60 minutes before these activities. Reduce your stress by getting enough sleep, working on hobbies you enjoy and practicing relaxation or meditation. Talk to your care team about ways to manage your pain beyond prescription opioids.    These medicines have risks:    DO NOT drive when on new or higher doses of pain medicine. These medicines can affect your alertness and reaction times, and you could be arrested for driving under the influence (DUI). If you need to use opioids long-term, talk to your care team about driving.    DO NOT operate heavy machinery    DO NOT do any other dangerous activities while taking these medicines.    DO NOT drink any alcohol while taking these medicines.     If the opioid prescribed includes acetaminophen, DO NOT take with any other medicines that contain acetaminophen. Read all labels carefully. Look for the word  acetaminophen  or  Tylenol.  Ask your pharmacist if you have questions or are unsure.    You can get addicted to pain medicines, especially if you have a history of addiction (chemical, alcohol or substance dependence). Talk to your care team about ways to reduce this risk.    All opioids tend to cause constipation. Drink plenty of water and eat foods that have a lot of fiber, such as fruits, vegetables, prune juice, apple juice and high-fiber cereal. Take a laxative (Miralax, milk of magnesia, Colace, Senna) if you  don t move your bowels at least every other day. Other side effects include upset stomach, sleepiness, dizziness, throwing up, tolerance (needing more of the medicine to have the same effect), physical dependence and slowed breathing.    Store your pills in a secure place, locked if possible. We will not replace any lost or stolen medicine. If you don t finish your medicine, please throw away (dispose) as directed by your pharmacist. The [place] [employer]ution [employer] has more information about safe disposal: https://www.[employer].us/living-green/managing-unwanted-medications             Medication List: This is a list of all your medications and when to take them. Check marks below indicate your daily home schedule. Keep this list as a reference.      Medications           Morning Afternoon Evening Bedtime As Needed    FUROSEMIDE PO   Take 20 mg by mouth every morning                                hydrOXYzine 25 MG tablet   Commonly known as:  ATARAX   Take 1 tablet (25 mg) by mouth every 6 hours as needed for itching (and nausea)                                ibuprofen 600 MG tablet   Commonly known as:  ADVIL/MOTRIN   Take 1 tablet (600 mg) by mouth every 6 hours as needed for pain (mild)                                ondansetron 4 MG ODT tab   Commonly known as:  ZOFRAN-ODT   Take 1-2 tablets (4-8 mg) by mouth every 6 hours as needed for nausea Dissolve ON the tongue.                                oxyCODONE-acetaminophen 5-325 MG per tablet   Commonly known as:  PERCOCET   Take 1-2 tablets by mouth every 4 hours as needed for pain (moderate to severe)   Last time this was given:  1 tablet on 9/17/2018  3:14 PM                                senna-docusate 8.6-50 MG per tablet   Commonly known as:  SENOKOT-S;PERICOLACE   Take 1-2 tablets by mouth 2 times daily Take while on oral narcotics to prevent or treat constipation.                                TOPROL XL PO   Take 25 mg by mouth 2 times daily                                 VITAMIN B 12 PO   Take 1,000 mcg by mouth daily

## 2019-03-04 ENCOUNTER — OFFICE VISIT (OUTPATIENT)
Dept: VASCULAR SURGERY | Facility: CLINIC | Age: 49
End: 2019-03-04
Payer: COMMERCIAL

## 2019-03-04 ENCOUNTER — APPOINTMENT (OUTPATIENT)
Dept: VASCULAR SURGERY | Facility: CLINIC | Age: 49
End: 2019-03-04
Payer: COMMERCIAL

## 2019-03-04 DIAGNOSIS — Z53.9 ERRONEOUS ENCOUNTER--DISREGARD: Primary | ICD-10-CM

## 2019-03-04 PROCEDURE — 36468 NJX SCLRSNT SPIDER VEINS: CPT | Performed by: SURGERY

## 2019-03-04 PROCEDURE — 99202 OFFICE O/P NEW SF 15 MIN: CPT | Performed by: SURGERY

## 2019-03-04 PROCEDURE — S9999 SALES TAX: HCPCS | Performed by: SURGERY

## 2019-03-04 PROCEDURE — A6533 GC STOCKING THIGHLNGTH 18-30: HCPCS | Performed by: SURGERY

## 2019-03-04 NOTE — PROGRESS NOTES
[facility]: Joanne Sr return to see us at the [facility] office.  This 49-year-old patient has been evaluated by my associate Dr. Haines for left leg varicose veins in 2012.  At that time the ultrasound revealed normal deep system but reflux of the proximal thigh to proximal calf greater saphenous vein eating into her calf varicosities.  She did not desire any specific treatment at that time.  He discussed compression therapy and she had not done so.  She is noticing that these veins are more prominent and giving her more discomfort particular after standing all day.  No history of phlebitis-deep venous thrombosis-ulcerations-bleeding.  Does notice some left ankle edema in the evening after being on her feet.      He also has a long history of bilateral spider telangiectasias.  She is undergone multiple injections by Dr. Haines but the last being done in 2012 with good results.  However these do not ectasias have returned particularly on the right anterior calf region.  She like us sclerotherapy today.    Exam: Alert and appropriate.             Chest= clear              Cardiovascular= regular rate              +3 posterior tibial pulses bilaterally              4 mm varicose vein starting the left distal medial thigh and extending down the mid medial calf.  Overlying skin is normal.  Significant telangiectasias on the right calf left popliteal region with scattered telangiectasias in the other areas.      Reviewed the duplex ultrasound from her last visit with her with the findings described above.      Procedure: Risks and benefits of sclerotherapy again reviewed with patient.  Consents were signed.  Using the polarized magnified Syris system and a 30-gauge needle I proceeded to inject the visible telangiectasias primarily on the right anterior calf but also in both popliteal regions and several scattered on both thighs anteriorly and posteriorly and a few on the left calf.  I used a  total of 17 syringes of undiluted 0.5% polidocanol with a very good result and no complications.  She tolerated this very well.  Thigh-high compression stockings were applied with postoperative instructions of removal, showering, and caution with sun exposure.  I would recommend that she wear these for the rest of the week and through the weekend.      Impression: #1.  Extensive spider telangiectasia.  Successful extensive sclerotherapy session today with 17 syringes for which she tolerated this very well.  Continue with compression as described.  Aware there would be some bruising that will gradually resolve.  Repeat treatment as needed.                          #2.   Increasing symptomatic left distal thigh and calf varicose veins due to incompetence of greater saphenous system.  She has not undergone a trial of compression and she will start this today for the next 3 months to see if this helps her.  She will return at that time for more formal left leg venous duplex ultrasound since the last study is over 6 years old.  We discussed the possibility of surgical treatment at that time which I reviewed briefly with her today.         VCSS  Right=0    Left=5       Champ Wilder MD     3/4/2019

## 2019-06-03 ENCOUNTER — APPOINTMENT (OUTPATIENT)
Dept: VASCULAR SURGERY | Facility: CLINIC | Age: 49
End: 2019-06-03
Payer: COMMERCIAL

## 2019-06-03 ENCOUNTER — OFFICE VISIT (OUTPATIENT)
Dept: VASCULAR SURGERY | Facility: CLINIC | Age: 49
End: 2019-06-03
Payer: COMMERCIAL

## 2019-06-03 DIAGNOSIS — Z53.9 ERRONEOUS ENCOUNTER--DISREGARD: Primary | ICD-10-CM

## 2019-06-03 PROCEDURE — 99213 OFFICE O/P EST LOW 20 MIN: CPT | Performed by: SURGERY

## 2019-06-03 PROCEDURE — 93971 EXTREMITY STUDY: CPT | Performed by: SURGERY

## 2019-06-03 NOTE — PROGRESS NOTES
[facility]: Joanne Sr returns to see me today for a left leg venous duplex ultrasound.  She has had painful left leg varicose veins in her left thigh and calf which we initially saw her in 2012.  At that time she had incompetence of the greater saphenous vein.  She noticed that these are gradually increasing causing her more pain with standing and sitting.  No history of DVT, ulceration, phlebitis, significant swelling.    She underwent extensive sclerotherapy session on 3/4/2019 which was very successful and she is very pleased with the results.    We initiated compression therapy at that time.  She is been wearing this on a daily basis and has noted some improvement of her calf varicose vein pain.  However, since the veins in bilateral the thigh and calf using a long-term daily thigh-high compression stocking is not feasible.    Exam: Alert and appropriate.  Good effects of the sclerotherapy with no complications.  Several are still present but overall much improved and she is quite pleased.    Dilated varicose veins consistent with her previous exam in the thigh and calf being more prominent on the calf coming off the greater saphenous system.    Good distal pulses.  No significant swelling.  Skin is normal.    Venous duplex ultrasound was done today and I reviewed this with her.  No DVT.  Left common femoral vein is incompetent but the rest of the deep system is normal.  Greater saphenous vein  He is incompetent with reflux greater than 500 ms.  This involves the mid and distal thigh with diameters of 8.8 and 5.8 mm respectively.  Incompetence goes to the mid calf where there is a competent segment and then incompetent from the mid calf to ankle.  A 5.2 mm and 4.6 mm varicosity comes off the incompetent knee segment and a 3.1 mm off the distal calf segment of the greater saphenous vein.  Lesser saphenous vein is incompetent only in the popliteal region coming off the Giacomini vein in the  distal thigh but clinically not with a large varicosity.  No incompetent perforators.  Somewhat surprising the accessory saphenous vein is not incompetent but we do see a varicosity in the thigh which we noticed clinically.      Impression: #1.  Ongoing painful primarily calf varicose veins due to incompetence of the greater saphenous vein.  She is tried compression therapy and still has ongoing issues though some improvement with her use which is encouraging.  Long-term thigh-high compression stockings are not a feasible long-term alternative and a otherwise young and active woman.  I would thus recommend that we perform surgery.  We did perform closure of the entire greater saphenous vein from the saphenofemoral junction the ankle.  Aware there may be some temporary or even permanent numbness of the calf greater saphenous nerve and we discussed this.  Also cosmetic stab phlebectomies of the thigh and calf varicose veins.  This performed our office under light oral sedation and a tumescent anesthetic.  Discussed the postoperative compression and ultrasound follow-up.  We will work on getting this approved which she may wait to early fall to undergo the procedure.                           #2.  Successful extensive bilateral sclerotherapy.  No need for repeat treatment at this time though it would not be uncommon for further treatment to be performed periodically.      Champ Wilder MD     Left leg VCSS=5

## 2022-07-26 ENCOUNTER — TELEPHONE (OUTPATIENT)
Dept: OTHER | Facility: CLINIC | Age: 52
End: 2022-07-26

## 2022-07-26 DIAGNOSIS — I83.812 VARICOSE VEINS OF LEFT LOWER EXTREMITY WITH PAIN: Primary | ICD-10-CM

## 2022-07-26 NOTE — TELEPHONE ENCOUNTER
Pt is calling and is wanting to now schedule an US and appt w/Omlie, however the last time she was seen was back on 3-4-19.  She is also talking about doing BOTH legs, but was only seen for the left leg.    She wants to schedule the [facility] US and appt w/Omlie on the same day.    Please advise.

## 2022-07-26 NOTE — TELEPHONE ENCOUNTER
Due to a schedule change. Called and LVM letting patient know I had moved her appointment to the following week 9/26/22 and to call the clinic if that day didn't work. Call back number left.

## 2022-07-26 NOTE — TELEPHONE ENCOUNTER
Spoke with patient. Left leg is the leg that is giving her the most trouble. Patient reports not having and varicose veins on the right leg but wanted to take precautionary measures. After speaking more with patient left leg ultrasound and return visit was scheduled with Dr. Wilder on 9/19/22. Informed patient that if there was any schedule issues she would be notified and we would get her in at the next available time. Patient verbalized understanding of information. No further questions or concerns.

## 2022-09-26 ENCOUNTER — OFFICE VISIT (OUTPATIENT)
Dept: VASCULAR SURGERY | Facility: CLINIC | Age: 52
End: 2022-09-26
Attending: SURGERY
Payer: COMMERCIAL

## 2022-09-26 DIAGNOSIS — I83.812 VARICOSE VEINS OF LEFT LOWER EXTREMITY WITH PAIN: Primary | ICD-10-CM

## 2022-09-26 DIAGNOSIS — I83.93 SPIDER VEINS OF BOTH LOWER EXTREMITIES: ICD-10-CM

## 2022-09-26 PROCEDURE — 99214 OFFICE O/P EST MOD 30 MIN: CPT | Performed by: SURGERY

## 2022-09-26 RX ORDER — FERROUS SULFATE 325(65) MG
325 TABLET ORAL
COMMUNITY
Start: 2022-04-02 | End: 2022-09-26

## 2022-09-26 NOTE — NURSING NOTE
Patient Reported symptoms:    Right leg   Heaviness Some of the time   Achiness Some of the time   Swelling Most of the time   Throbbing A little of the time  Itching Some of the time   Appearance Moderately noticeable  Impact on work/activities Symptoms but full able to participate    Left Leg   Heaviness Some of the time   Achiness Some of the time   Swelling Some of the time   Throbbing A good bit of the time   Itching Some of the time   Appearance Very noticeable   Impact on work/activities Symptoms but full able to participate

## 2022-09-26 NOTE — PATIENT INSTRUCTIONS
Pre-Procedure Instructions:                           VNUS Closure and Phlebectomies  You are having a Closure(s) - where one or more veins are closed and Phlebectomies - where one or more veins are removed.    Insurance  Precertification and/or referral authorization may be required by your insurance company.  We will call your insurance company to verify benefits for the medically necessary part of your procedure.    1 unit of cosmetic phlebectomies - $763.00    Your Current Medications and Allergies  To reduce bruising, please do not take aspirin-type medications (Motrin, Aleve, Ibuprofen, Advil, etc.) for three days before your procedure. You may take Tylenol if you need a pain reliever.  Are you on blood thinner medications? (Plavix, Coumadin, Eliquis, Xarelto) Please discuss this with your surgeon. You may resume taking your blood thinner medication after your procedure.  Are you sensitive to latex or adhesives used for fake fingernails? Please let us know!    Driving Escort and   Please arrange to have a trusted adult (18 years old or older) drive you to and from the clinic.  For your safety, we recommend you have a trusted adult to stay with you until the next morning.    Your Health  If you have a change in your health before the procedure, contact our office immediately.   (For example: cold symptoms, cough, urinary tract infection, fever, flu symptoms).  A pre-procedure physical is not required.    Note  It is sometimes necessary to adjust the procedure schedule due to emergencies. We greatly appreciate your flexibility and understanding in this matter.  Compression hose are needed following this procedure.                   Check List: The Morning of Your Procedure  ___1. Please do not put anything on your leg(s) or shave the day of your procedure.  ___2. You may take your normal medications the day of your procedure.  ___3. It is recommended you eat a light breakfast or lunch the day of your  procedure.  ___4. Wear comfortable loose-fitting clothing and wide-fitting shoes (i.e. tennis shoes, slip-ons).  ___5. Please arrive at our clinic at the specified time given by the nurse.  ___6. You will sign an affirmation of informed consent.  ___7. Bring your pre-procedure sedation medication (lorazepam and clonidine) with you to the clinic. One hour              before your procedure, you will be instructed to take these medications. The lorazepam (Ativan) lowers              anxiety and sedates you; the clonidine makes the lorazepam more effective. Everyone's body processes              these medications differently. Therefore, reactions to these medications vary. Some people stay awake and              some people sleep through the whole procedure. You may not remember everything about the procedure              or the day. You do not want to make any big decisions for the rest of the day.     The Day of Your Procedure:       VNUS Closure and Phlebectomies  In the Exam Room  A nurse will bring you back to an exam room with your family member or friend. This is when your informed consent will be signed, and you will take your pre-procedure medications.  You will be asked to remove everything from the waist down, including undergarments. You will then put on a hospital gown or shorts and blue booties.  Your surgeon will come in to answer any questions and michelle any bulging varicose veins to be removed.  You will be taken to the restroom to empty your bladder before going into the procedure room.    In the Procedure Room  You will be escorted to the procedure room. You will lie on a procedure table covered with a sheet or blanket.  A nurse will put a blood pressure cuff on your arm and a pulse/oxygen monitor on a finger. Your vital signs will be monitored every 15 minutes.  Your gown will be pulled up slightly and the groin exposed for a short period of time. The surgeon's assistant will clean your foot, leg, and  groin with an antibacterial solution. We will get you covered up as quickly as possible!  Sterile towels and blue drapes will be used to cover you and the table. You will be asked to keep your hands under the blue drapes during the procedure.  The lights will be turned down. The table will be tipped so your head is higher than your feet. You may feel like you're going to slide off, but you won't.    The Procedure  The surgeon will visualize your veins with an ultrasound machine. He or she will then numb your skin and access the vein. A catheter is passed up the vein and positioned with ultrasound guidance. The table will then be tipped head down.  Once the catheter is in the correct position, medication will be injected to numb your leg. You will feel some needle sticks and may feel discomfort as the medication goes in. Once this is done, you should not experience significant discomfort. But if you do, please let us know and more numbing medication can be injected. As the catheter sends out heat, the vein closes off and the catheter is withdrawn.  For the phlebectomy part of the procedure, small incisions are made where the bulging varicose veins have been marked on your leg(s) and these veins will be removed using a small enrike hook instrument.                    VNUS closure                  Phlebectomy    Post-Procedure  Once the procedure is done, your leg(s) will be washed with warm water and dried. Your leg(s) will be bandaged with large soft dressings and a large ace bandage wrapped from toes to groin.   You will be offered something to drink and a light snack.  You will rest with your leg(s) elevated for approximately 30 minutes. Your friend or family member may join you.  For your safety, you will be taken to your car in a wheelchair. If you are able to, it is good to keep your leg(s) elevated on the car ride home.    Post-Procedure Instructions:             VNUS Closure and Phlebectomies    Post-Op Day  Zero - The Day of Your Procedure:0  1. Medication for Pain Control and Inflammation Control   - The numbing medication injected during your procedure will last for several hours. The pre-procedure                 tablets may make you very sleepy and you might not remember everything from the procedure or from                 the day. This will usually wear off by the next day.   - Ibuprofen:  If tolerated, take ibuprofen (e.g., Advil) to reduce inflammation whether or not you have                 pain. For three days, take two tablets (200 mg each) with every meal and at bedtime with a snack. If                 your pain is not controlled with ibuprofen, you may take prescription pain medication (such as Norco),                 if prescribed.   - You may resume taking any medications you were taking before your procedure.  2. Activity   - Rest with your leg(s) elevated above your heart. This will prevent swelling and bleeding.                 You do not need to elevate your leg(s) while sleeping at night. You may go upstairs, sit up to                 eat, use the bathroom, and take several five-minute walks. Otherwise, keep your leg(s) elevated.                 Minimize the amount of time you are up on your feet to about 30 minutes at a time.  3. Bandages   - The incision sites will be covered with soft bandages and an ACE wrap. Keep your bandages on and       dry for 48 hours. The ACE should provide  snug  compression but should not cause pain or numbness       in the toes. If you have significant discomfort or your toes become cold or numb, unwrap your ACE and       rewrap with less tension starting at the toes wrapping upward.  4. Incisions   - Bleeding: You may see some incision sites that are oozing through the bandages. This is not unusual       and can be managed with Rest, Ice, Compression and Elevation (RICE). Apply ice and firm pressure       directly to the site that is bleeding and rest with your leg(s)  elevated above your heart for 20-30 minutes.    Post-Op Day One:  1. Medication   - Ibuprofen: Continue the same as the Day of Your Procedure. If your pain is not controlled with       ibuprofen, you may take prescription pain medication (such as Norco), if prescribed.   2. Activity   - We would like you to get up at least six times and walk around for short periods of time, unless it is       causing you pain. You should not be on your feet more than 90 minutes at a time. Elevate your leg       above your heart when you are not walking.  3. Bandages   - Your bandages must be kept on and dry for 48 hours.  4. Driving   - You may resume driving when you can do so safely. Do not drive if you are taking narcotic pain       medication.    Post-Op Day Two:   1. Medication  - Ibuprofen: Continue the same as the Day of Your Procedure.  2.  Activity  - Walk as tolerated. Elevate as much as possible when not walking.  3. Bandages and Compression  - Remove ACE wrap and padding. Shower and put on your compression hose during waking hours only for at least five days.    (Your doctor may instruct you to keep your bandages on until your return appointment; please follow your doctor's instructions.)  4. Incisions  - Your leg(s) will be bruised; there may be swelling, hard knots under the skin and possibly some numbness. These will likely resolve over time.   If you see  hair-like  strings coming out of your incisions, do not pull them (this will only cause pain/discomfort). We will trim them when you come back for your follow-up appointment.  5. Call Us If:   - You see any areas on your leg that are red and angry in appearance.   - You notice any drainage that is milky or cloudy in appearance or has a foul odor.   - You run a temperature of 100.5 or greater.    Post-Op Day Three:  You will have a follow up appointment 2-4 days post-procedure. At this appointment, you will have an ultrasound and we will check your incisions.     __________________________________________________________________________________________    The Two Weeks Following Your Procedure  1.  Skin Care   - Do not use any lotions, creams or powders on your incision(s) for 14 days or until the incisions have               healed.   - Do not soak in a bathtub, hot tub or go swimming for 14 days or until your incisions have healed.  2.  Medications   - You may use ibuprofen or acetaminophen (e.g., Tylenol) as needed for pain or discomfort.  3.  Activity   - Do not lift over 25 pounds. After about two weeks you may resume exercise such as aerobics, running,               tennis or weightlifting. Use your common sense and ease back into your exercise routine slowly.   - You may feel a cord-like tightness along the inside of your leg. Gentle stretching can be helpful.  4. Compression Hose   - Your doctor may instruct you to wear compression for longer than seven days; please    follow your doctor's instructions. As a comfort measure, you may choose to wear compression for    longer than required.  5.  Travel   - Do not fly in an airplane for 14 days after your procedure. If you have a long car trip planned within    two to three weeks following your procedure, stop and walk for a few minutes every two hours.    Periodic ankle pumps during the ride may be helpful.    Six Week Appointment  - At your six-week appointment, you will see your surgeon for an exam and evaluation. This office visit   will be scheduled when you return for post-op day three return appointment.     Return to Work  1.  If you work outside the home, you may return to work in a few days depending on the extent of your        procedure, how you tolerate it, and the type of work you perform.  2.  Paperwork: If your employer requires paperwork or you would like a letter written to your employer, please        let us know. We will complete disability type forms at no charge. Please allow five business days  for forms        to be completed.      [employer] last reviewed this educational content on 11/1/2019 (RFA photo), 12/1/2019 (Phlebectomy photo)    6529-0899 [employer]. All rights reserved. This information is not intended as a substitute for professional medical care. Always follow your healthcare professional's instructions.           Sclerotherapy: Pre-Treatment Instructions    Recommended Sessions:  _2+_ treatment sessions    Pricing: Full session - $407                 *Payment is due at the time of visit following the treatment    Time Required per Treatment Session - About 45 minutes  Please come in 15 minutes before your scheduled appointment.  30 min.  Sclerotherapy treatments last approximately 30 minutes.  5 min.    A staff member will wipe your legs off with warm water and dry them with a wash cloth.                 Then you can put your compression hose on, get dressed and check out.  10 min.  After your treatment, you will be asked to walk around for 10 minutes before you get in your car.    Medications  Five days before your appointment, discontinue aspirin (Bufferin, Anacin, etc.) and Ibuprofen (Motrin, Advil, Aleve, etc.) to reduce bruising. Resume these medications the day following the treatment.    Leg Preparation  Do not shave your legs or apply any oil, lotion or powder the night before or the day of your treatment.    Clothing  Shorts:  Bring a pair of loose, comfortable shorts to wear during your treatment (or you can choose to wear ours). Shoes: Bring comfortable shoes to accommodate the compression hose after your treatment. Do not wear flip-flops or thong-style sandals unless you have open-toe compression hose.    Photographs  Photos will be taken before each treatment. This helps monitor your progress.    Injections  The physician will inject your veins with the sclerotherapy solution chosen to meet your specific needs.    Compression Hose  Please bring your compression hose  if you have them. They may also be reserved for you at our clinic. Compression hose must be worn immediately after each sclerotherapy treatment.  The hose must be compression level 20-30, and they must be worn for 24 hours straight after your treatment.  If you have never worn compression hose before, a staff member will teach you how to put them on.             You cannot have a treatment without compression hose.               They are critical to the success of your treatment!    You may purchase your compression hose from us. We will measure you and have the hose available when you come for your treatment.    Cancellation and Rescheduling  If you need to cancel or reschedule your sclerotherapy treatment, please give our office at least 24 hour notice.    Sclerotherapy: Basic Information        What is sclerotherapy?  Sclerotherapy is a treatment for  spider  veins.  Spider  veins are small veins just under your skin that can look red, blue or purple. Most  spider  veins are only a cosmetic problem.  Spider  veins are not useful and treating them will not affect your circulation.    How does sclerotherapy work?  1.  Injections: A very small needle is used to inject a solution into the  spider  veins. The solution irritates the cells that line the vein walls. This causes the veins to collapse. The vein walls to stick together and they can no longer carry blood. Different solutions are used based on the size of the veins.  2.  Compression:  The spider veins are kept collapsed by wearing compression stockings. Your body will break down and absorb the treated veins. You wear the compression hose for 24 hours after the treatment and then for 4 more days during your waking hours only.    How does the body heal after sclerotherapy?  The process is similar to how your body heals after a bad bruise. It takes 4-6 weeks or more for the healing to be complete. When the healing is complete, the vein is no longer visible. It  may take more than one treatment.    How do I get the best results?  It is important to follow the post-sclerotherapy instructions. The best results require time and patience. The injection sites will continue to heal and fade for months after a treatment. Please discuss your expectations with your doctor to keep them realistic. Your doctor will do everything possible to meet or exceed your expectations.    How many treatments are needed?  After your initial exam, your doctor will give you an estimate of the number of treatments that may be required. It depends upon the size, type, and quantity of your  spider  veins and on the doctor's assessment, your history and expectations. You may end up needing fewer or more treatments.    How soon can I have another treatment?  Additional treatments are scheduled every 4-6 weeks to allow time for the body to respond to the previous treatment.    Common Side Effects:  Itching  The areas that were injected may itch. This is usually mild and lasts less than a day. Do not use lotions or creams on your legs until the injection sites have healed over.    Pain  It is common to have some tenderness at the injection sites. Injection of the solution can be uncomfortable, but is usually well tolerated by most patients. The tenderness is temporary, lasting 24 hours at most. Tylenol or Ibuprofen can be used, if needed, following the product directions.    Bruising  This may occur at the injections sites. Bruising may be minimized by avoiding Aspirin and Ibuprofen products for five days before each treatment session.    Hyperpigmentation  A light brown discoloration of the skin may develop along the veins in the areas injected. Approximately 20-30% of patients treated note the discoloration (which is lighter and less obvious than the veins that are being treated). The hyperpigmentation usually fades in a couple of weeks, but may take several months to a year to totally resolve. There is a  1% chance of hyperpigmentation continuing after one year.    Trapped blood  A small amount of blood may become trapped and hardened in the veins. This may feel like a knot or cord and it may look dark blue or bruised. This is a common occurrence. You may need to return before your next treatment so this area can be drained to remove the trapped blood. This will reduce the hyperpigmentation that can occur. The chance of this occurring can be decreased with proper use of compression hose after your treatment.    Matting  Matting is the formation of new, fine  spider  veins in the area injected.  It occurs in approximately 10% of patients injected. The exact reason for this is unknown. If untreated, the matting usually resolves in 3 to 12 months, but very rarely, it can be permanent. If the matting does not fade, it can be re-injected.    Rare Side Effects:  Ulceration at injection sites  Very rarely, a small ulcer will occur at the site where a vein is injected. An ulcer can take 4 to 6 weeks to completely heal. A small scar may result.    Allergic reactions  There is a very rare incidence of an allergic reaction to the solution injected. You will be observed for such reaction and will be treated appropriately should it occur. Please inform us of any allergy history.    Pulmonary embolus/Deep vein thrombosis  This is a blood clot which moves to the lungs/a blood clot in the deep vein system. There is an extraordinarily low incidence of this complication.      SCLEROTHERAPY AFTER CARE  Immediately:  After treatment, walk for 10-15 minutes before getting in your car.  If your trip home is more than 1 hour, stop and walk around for 5-10 minutes. Avoid sitting or standing for extended periods.   First 24 hours: Wear your compression continuously, even while in bed. After the 24 hours, you may shower if you want to. Put your hose back on, unless you are going to bed. You should NOT wear compression to bed after the first  24 hours. You may fly the next day, but wear your compression.   For 5 days: Wear the compression hose for waking hours only. You may continue to wear them longer than 5 days if you prefer.   For days 5-7: Walking is encouraged, as it promotes efficient circulation in your veins. You may do activities that raise your heart rate, but do NOT run, jog, do high impact aerobics, or weight lifting. After 7 days, no activity restrictions.  Shaving: Wait a few days to shave or apply lotion.   Bathing: Do NOT take hot baths or sit in a hot tub for 7-10 days.    For 1 year: Wear SPF 30 sunscreen on your legs when in the sun. This is very important! It helps prevent darkening of the skin at the injection sites.   Medications: You may resume your usual medications, including aspirin or ibuprofen.    Common Things to Expect       Compression must be worn for the first 24 hours and then during the day for 5-7 days.    If larger veins are treated with ultrasound-guided sclerotherapy, you will have redness, firmness, tenderness, and swelling.  This firmness and tenderness may take 3-6 months to resolve. Ibuprofen and compression hose will aid in this process.    You will have bruising that can last up to 3 weeks. Most fading of the veins will occur between 3 and 6 weeks after treatment.    You may notice brown discoloration (hyperpigmentation) at the treatment site.  This should fade with time, but will take 3 months to 1 year to fully heal.     Some treated veins may look darker because of trapped blood within the vein. This trapped blood can be removed at a minimum of 1 month following treatment. Larger veins are more likely to develop trapped blood.    It is very important for you to use at least SPF 30 sunscreen in order to help prevent the discoloration of your skin.    Migraines rarely occur following sclerotherapy, but are more likely in patients with a history of migraines.  Treat as you would any other  migraine.      [employer] last reviewed this educational content on 11/1/2019 2000-2021 [employer]. All rights reserved. This information is not intended as a substitute for professional medical care. Always follow your healthcare professional's instructions.

## 2022-09-26 NOTE — LETTER
9/26/2022         RE: Farideh Sr  [id number] [facility]  [place] [address]        Dear Colleague,    Thank you for referring your patient, Farideh Sr, to the [facility]IN [facility] [place]. Please see a copy of my visit note below.               [facility]: Joanne Sr returns for follow-up.  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  She has worn compression therapy on and off for years with no improvement.  I have seen her in the past for extensive sclerotherapy with the last treatment on 3/4/2019.    Greater saphenous vein and partially incompetent a venous duplex ultrasound in 2012.  On her visit in 2019 due to the ongoing discomfort we had discussed surgical treatment of the left leg varicose veins.  Due to the pandemic she has not done this.  Veins and become more uncomfortable when she comes today with a repeat ultrasound to discuss this and the findings.    Exam: Alert and appropriate.  Normal affect.   Chest= clear   Cardiovascular= regular rate   Right leg is unremarkable.   Left leg has a tortuous varicosity in the proximal mid thigh going to the distal medial thigh measuring approximately 2 mm in diameter likely coming from the accessory saphenous vein.  Larger varicose veins are noted starting at the level of the knee in the medial calf and extending down towards the ankle and measure 3 to 4 mm in diameter.  She also has diffuse spider telangiectasias primarily in the left calf region.   Normal pulses bilaterally.    Left leg venous duplex ultrasound performed today and discussed: No DVT.  Left common femoral vein is incompetent but the rest of the deep veins are normal and this is of no clinical significance.  We also see reflux of the right common femoral vein which is also clinically not significant.  Greater saphenous vein in the thigh is somewhat larger at 5.2 mm but competent.  However at the knee  and the proximal calf greater saphenous vein is incompetent measuring 5.8 mm with a maximum reflux of 3349 ms.  This gives off the varicosities we see in the calf that measure between 3.9 and 5.3 mm in diameter.  No incompetent perforators.  Left accessory saphenous vein feeds see incompetent varicosities that measure 4.7 mm in diam with reflux of 2623 ms.  Accessory saphenous vein penetrates the fascia at 12 cm and measures 4.1 mm in diameter.  Lesser saphenous vein is unremarkable with a maximum diameter of 2.8 mm.    IMPRESSION: Symptomatic left thigh and calf varicosities.  She has failed compression therapy and has ongoing symptoms.    I would recommend closure with of the left greater saphenous vein which is incompetent in the knee and proximal calf.  Even though it is competent in the thigh I feel it is reasonable to close the entire length of the dilatation.  Need to close beyond the varicosity in the mid and distal calf and thus there is some chance that she may notice numbness along the greater saphenous nerve distribution and this is been discussed.    At the same time we could consider closure of the accessory saphenous vein since it does not feed into the large varicosity if this is feasible.    Would plan cosmetic stab phlebectomies of the thigh and calf varicosities during the procedure which were performed under a light oral sedation and tumescent anesthetic.  Discussed the post compression protocol and duplex follow-up along with return to normal activities.    Would also plan for cosmetic sclerotherapy of her spider veins particular in the left leg just prior to the operative procedure.  She had no questions about this since she is undergone this multiple times in the past.    Left leg VCSS= 6   CEAP:C3 25 minutes with patient today including review of ultrasound and above recommendations.    Champ Wilder MD      VEIN CLINIC LEG DRAWING:                  September 26, 2022    Vein  Procedure Recommendation    Spoke with patient in clinic.    Dr. Wilder has recommended patient to have the following vein procedure(s):     1. Left leg VNUS closure ***    2. {LEFTLEG/RIGHTLEG/BILATERALLE} {VEINPROCEDURE:391736}      Patient is recommended to wear {Compression Hose Style:104110} compression hose following {His/her (Lower case):968442} procedure. Discussed compression hose. Explained to patient if insurance doesn't cover the compression hose there are a couple different options to getting them and to call the clinic to let us know if they need help.    {ptdelivery:179095} written procedure instructions to review on {His/her (Lower case):043212} own (see After Visit Summary).      Next steps:    Insurance Submission  Informed patient this process could take up to 14 business days, but once approved, the patient will be contacted by our surgery scheduler to schedule the above procedure. Gave patient our surgery scheduler's information.    {Closin}    Delaney Cai RN  [facility]  Vein Clinic      Again, thank you for allowing me to participate in the care of your patient.        Sincerely,        Champ Wilder

## 2022-09-26 NOTE — PROGRESS NOTES
September 26, 2022    Vein Procedure Recommendation    Spoke with patient in clinic.    Dr. Wilder has recommended patient to have the following vein procedure(s):     1. Left leg VNUS closure GSV, ASV (med nec), Phlebectomies (cosmetic) 1 unit, and Sclerotherapy (cosmetic)      Patient is recommended to wear Thigh High compression hose following her procedure. Discussed compression hose. Explained to patient if insurance doesn't cover the compression hose there are a couple different options to getting them and to call the clinic to let us know if they need help.    Handed patient written procedure instructions to review on her own (see After Visit Summary).      Next steps:    Insurance Submission  Informed patient this process could take up to 14 business days, but once approved, the patient will be contacted by our surgery scheduler to schedule the above procedure. Gave patient our surgery scheduler's information.    Pt is interested in having this procedure done this Fall.    Patient is in agreement with all of the above and has no further questions at this time.    Delaney Cai RN  [facility]  Vein Clinic

## 2022-09-26 NOTE — PROGRESS NOTES
[facility]: Joanne Sr returns for follow-up.  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  She has worn compression therapy on and off for years with no improvement.  I have seen her in the past for extensive sclerotherapy with the last treatment on 3/4/2019.    Greater saphenous vein and partially incompetent a venous duplex ultrasound in 2012.  On her visit in 2019 due to the ongoing discomfort we had discussed surgical treatment of the left leg varicose veins.  Due to the pandemic she has not done this.  Veins and become more uncomfortable when she comes today with a repeat ultrasound to discuss this and the findings.    Exam: Alert and appropriate.  Normal affect.   Chest= clear   Cardiovascular= regular rate   Right leg is unremarkable.   Left leg has a tortuous varicosity in the proximal mid thigh going to the distal medial thigh measuring approximately 2 mm in diameter likely coming from the accessory saphenous vein.  Larger varicose veins are noted starting at the level of the knee in the medial calf and extending down towards the ankle and measure 3 to 4 mm in diameter.  She also has diffuse spider telangiectasias primarily in the left calf region.   Normal pulses bilaterally.    Left leg venous duplex ultrasound performed today and discussed: No DVT.  Left common femoral vein is incompetent but the rest of the deep veins are normal and this is of no clinical significance.  We also see reflux of the right common femoral vein which is also clinically not significant.  Greater saphenous vein in the thigh is somewhat larger at 5.2 mm but competent.  However at the knee and the proximal calf greater saphenous vein is incompetent measuring 5.8 mm with a maximum reflux of 3349 ms.  This gives off the varicosities we see in the calf that measure between 3.9 and 5.3 mm in diameter.  No incompetent perforators.  Left accessory saphenous vein  feeds see incompetent varicosities that measure 4.7 mm in diam with reflux of 2623 ms.  Accessory saphenous vein penetrates the fascia at 12 cm and measures 4.1 mm in diameter.  Lesser saphenous vein is unremarkable with a maximum diameter of 2.8 mm.    IMPRESSION: Symptomatic left thigh and calf varicosities.  She has failed compression therapy and has ongoing symptoms.    I would recommend closure with of the left greater saphenous vein which is incompetent in the knee and proximal calf.  Even though it is competent in the thigh I feel it is reasonable to close the entire length of the dilatation.  Need to close beyond the varicosity in the mid and distal calf and thus there is some chance that she may notice numbness along the greater saphenous nerve distribution and this is been discussed.    At the same time we could consider closure of the accessory saphenous vein since it does not feed into the large varicosity if this is feasible.    Would plan cosmetic stab phlebectomies of the thigh and calf varicosities during the procedure which were performed under a light oral sedation and tumescent anesthetic.  Discussed the post compression protocol and duplex follow-up along with return to normal activities.    Would also plan for cosmetic sclerotherapy of her spider veins particular in the left leg just prior to the operative procedure.  She had no questions about this since she is undergone this multiple times in the past.    Left leg VCSS= 6   CEAP:C3 25 minutes with patient today including review of ultrasound and above recommendations.    Champ Wilder MD      VEIN CLINIC LEG DRAWING:

## 2022-10-20 DIAGNOSIS — I83.812 VARICOSE VEINS OF LEFT LOWER EXTREMITY WITH PAIN: Primary | ICD-10-CM

## 2022-11-17 ENCOUNTER — TELEPHONE (OUTPATIENT)
Dept: VASCULAR SURGERY | Facility: CLINIC | Age: 52
End: 2022-11-17

## 2022-11-17 DIAGNOSIS — I83.812 VARICOSE VEINS OF LEFT LOWER EXTREMITY WITH PAIN: Primary | ICD-10-CM

## 2022-11-17 RX ORDER — LORAZEPAM 1 MG/1
TABLET ORAL
Qty: 3 TABLET | Refills: 0 | Status: SHIPPED | OUTPATIENT
Start: 2022-11-17 | End: 2022-11-28

## 2022-11-17 RX ORDER — CLONIDINE HYDROCHLORIDE 0.1 MG/1
TABLET ORAL
Qty: 1 TABLET | Refills: 0 | Status: SHIPPED | OUTPATIENT
Start: 2022-11-17 | End: 2022-11-28

## 2022-11-17 NOTE — TELEPHONE ENCOUNTER
11/17/2022    Vein Clinic Preoperative Nurse Call    Procedure: Left leg VNUS closure GSV, ASV(med nec), 1unit phlebs($), sclero ($)  Date: Friday 11/25  Surgeon: Dr. Wilder  Time: 0730  Check in time: 0630    Called and spoke to patient. Informed patient: when to check in (0630) to sign consent, to bring their preop medications in their original bottle with them (3mg ativan, 0.1mg clonidine). Patient will take the medications after signing the consent to the procedure. Instructed patient to wear a mask, wear loose-fitting comfortable clothing, and bring their compression hose. Ensured patient has a /someone that will be responsible for them the rest of the day. Visitors are allowed in the clinic, but they would need to stay in an exam room or wait in the parking lot or leave. If  does leave, IF POSSIBLE, we ask that they do not go more than 15-20 mins from our clinic. Once procedure is completed, we will keep patient in recovery for 30-45 mins, and call  with aftercare instructions. Informed patient, that if possible, they should sit in the backseat to elevate their leg on the ride home.    Pt needs Thigh High compression hose for procedure. Status of the hose: patient has thigh high hose she will bring with her to [facility].    Special COVID-19 instructions: Patient aware they need to wear a mask to our clinic and during their procedure. Patient aware their  can come in with them, but would need to stay in an exam room during the procedure or wait in the parking lot or leave. Nurse will call patient's  when procedure is over.    Patient understands if they have any of the following symptoms (fever, cough, shortness of breath, rash), they need to notify us immediately to cancel their procedure and will have to reschedule for a later date.    Patient is in agreement with all of the above and has no further questions at this time.    Delaney Cai RN  [facility] Vein Clinic

## 2022-11-23 NOTE — PATIENT INSTRUCTIONS
Post-Procedure Instructions:             VNUS Closure and Phlebectomies      Post-Op Day Zero - The Day of Your Procedure:0  1. Medication for Pain Control and Inflammation Control   - The numbing medication injected during your procedure will last for several hours. The pre-procedure                 tablets may make you very sleepy and you might not remember everything from the procedure or from                 the day. This will usually wear off by the next day.   - Ibuprofen:  If tolerated, take ibuprofen (e.g., Advil) to reduce inflammation whether or not you have                 pain. For three days, take two tablets (200mg each) with every meal and at bedtime with a snack. If                 your pain is not controlled with ibuprofen, you may take prescription pain medication (such as Norco),                 if prescribed.   - You may resume taking any medications you were taking before your procedure.  2. Activity   - Rest with your leg(s) elevated above your heart. This will prevent from a lot of swelling and                 bleeding. You do not need to elevate your leg(s) while sleeping at night. You may go upstairs, sit up to                 eat, use the bathroom, and take several five minute walks. Otherwise, keep your leg(s) elevated.                 Minimize the amount of time you are up on your feet to about 30 minutes at a time.  3. Bandages   - The incision sites will be covered with soft bandages and an ACE wrap. Keep your bandages on and    dry for 48 hours. The ACE should provide  snug  compression, but should not cause pain or numbness    in the toes. If you have significant discomfort or your toes become cold or numb, unwrap your ACE and    rewrap with less tension starting at the toes wrapping upward.  4. Incisions   - Bleeding: You may see some incision sites that are oozing through the bandages. This is not unusual    and can be managed with Rest, Ice, Compression and Elevation (RICE). Apply  ice and firm pressure    directly to the site that is bleeding and rest with your leg(s) elevated above your heart for 20-30 minutes.    Post-Op Day One:  1. Medication   - Ibuprofen:  Continue the same as the Day of Your Procedure. If your pain is not controlled with    ibuprofen, you may take prescription pain medication (such as Norco), if prescribed.   2. Activity   - We would like you to get up at least six times and walk around for short periods of time, unless it is    causing you pain. You should not be on your feet more than 90 minutes at a time. Elevate your leg    above your heart when you are not walking.  3. Bandages   - Your bandages must be kept on and dry for 48 hours.  4. Driving   - You may resume driving when you can do so safely. Do not drive if you are taking narcotic pain    medication.    Post-Op Day Two:   1. Medication  - Ibuprofen: Continue the same as the Day of Your Procedure.  2.  Activity   - Walk as tolerated. Elevate as much as possible when not walking.  3. Bandages and Compression  - Remove ACE wrap and padding. Shower and put on your compression hose during waking hours only for at least 5 days. (Your doctor may instruct you to keep your bandages on until your return appointment; please follow your doctor's instructions.)  4. Incisions   - Your leg(s) will be bruised; there may be swelling, hard knots under the skin and possibly some    numbness. These will likely resolve over time. If you see  hair-like  strings coming out of your    incisions, do not pull them (this will only cause pain/discomfort). We will trim them when you come   back for your follow-up appointment.  5. Call Us If:   - You see any areas on your leg that are red and angry in appearance.   - You notice any drainage that is milky or cloudy in appearance or that has a foul odor.   - You run a temperature of 100.5 or greater.    Post-Op Day Three:  You will have a follow up appointment 2-4 days post-procedure. At  this appointment, you will have an ultrasound and we will check your incisions.    _______________________________________________________________________________________________    The Two Weeks Following Your Procedure  1.  Skin Care   - Do not use any lotions, creams or powders on your leg for 14 days or until the incisions have healed.   - Do not soak in a bathtub, whirlpool, or hot tub or go swimming for 14 days or until your incisions have  healed.  2.  Medications   - You may use ibuprofen or acetaminophen (e.g., Tylenol) as needed for pain or discomfort.  3.  Activity   - Do not lift over 25 pounds. After about two weeks you may resume exercise such as aerobics, running,    tennis or weight lifting. Use your common sense and ease back into your exercise routine slowly.   - You may feel a cord-like tightness along the inside of your leg. Gentle stretching can be helpful.  4. Compression Hose   - Your doctor may instruct you to wear compression for longer than seven days; please    follow your doctor's instructions. As a comfort measure, you may choose to wear compression for    longer than required.  5.  Travel   - Do not fly in an airplane for 14 days after your procedure. If you have a long car trip planned within    two to three weeks following your procedure, stop and walk for a few minutes every two hours.    Periodic ankle pumps during the ride may be helpful.    Six Week Appointment   At your six week appointment, you will see your surgeon for an exam and evaluation. This office visit    will be scheduled when you return for Post-op Day Three Return Appointment.     Return to Work  1.  If you work outside the home, you may return to work in a few days depending on the extent of your procedure, how you tolerate it, and the type of work you perform.  2.  Paperwork: If your employer requires paperwork or you would like a letter written to your employer, please let us know. We will complete disability type  forms at no charge. Please allow five business days for forms to be completed.       SCLEROTHERAPY AFTER CARE  Immediately:  After treatment, walk for 10-15 minutes before getting in your car.  If your trip home is more than 1 hour, stop and walk around for 5-10 minutes. Avoid sitting or standing for extended periods.   First 24 hours: Wear your compression continuously, even while in bed. After the 24 hours, you may shower if you want to. Put your hose back on, unless you are going to bed. You should NOT wear compression to bed after the first 24 hours. You may fly the next day, but wear your compression.   For 5 days: Wear the compression hose for waking hours only. You may continue to wear them longer than 5 days if you prefer.   For days 5-7: Walking is encouraged, as it promotes efficient circulation in your veins. You may do activities that raise your heart rate, but do NOT run, jog, do high impact aerobics, or weight lifting. After 7 days, no activity restrictions.    Shaving: Wait a few days to shave or apply lotion.   Bathing: Do NOT take hot baths or sit in a hot tub for 7-10 days.    For 1 year: Wear SPF 30 sunscreen on your legs when in the sun. This is very important! It helps prevent darkening of the skin at the injection sites.   Medications: You may resume your usual medications, including aspirin or ibuprofen.    Common Things to Expect  -  Compression must be worn for the first 24 hours and then during the day for 5-7 days.    -  If larger veins are treated with ultrasound-guided sclerotherapy, you will have redness, firmness, tenderness, and swelling.  This firmness and tenderness may take 3-6 months to resolve. Ibuprofen and compression hose will aid in this process.    -  You will have bruising that can last up to 3 weeks. Most fading of the veins will occur between 3 and 6 weeks after treatment.    -  You may notice brown discoloration (hyperpigmentation) at the treatment site.  This should  fade with time, but will take 3 months to 1 year to fully heal.     -  Some treated veins may look darker because of trapped blood within the vein. This trapped blood can be removed at a minimum of 1 month following treatment. Larger veins are more likely to develop trapped blood.    -  It is very important for you to use at least SPF 30 sunscreen in order to help prevent the discoloration of your skin.    -  Migraines rarely occur following sclerotherapy, but are more likely in patients with a history of migraines.  Treat as you would any other migraine.

## 2022-11-25 ENCOUNTER — OFFICE VISIT (OUTPATIENT)
Dept: VASCULAR SURGERY | Facility: CLINIC | Age: 52
End: 2022-11-25
Payer: COMMERCIAL

## 2022-11-25 VITALS — SYSTOLIC BLOOD PRESSURE: 106 MMHG | HEART RATE: 69 BPM | DIASTOLIC BLOOD PRESSURE: 64 MMHG | OXYGEN SATURATION: 98 %

## 2022-11-25 DIAGNOSIS — I83.93 SPIDER VEINS OF BOTH LOWER EXTREMITIES: ICD-10-CM

## 2022-11-25 DIAGNOSIS — I83.812 VARICOSE VEINS OF LEG WITH PAIN, LEFT: Primary | ICD-10-CM

## 2022-11-25 DIAGNOSIS — G89.18 ACUTE POST-OPERATIVE PAIN: ICD-10-CM

## 2022-11-25 PROCEDURE — 36475 ENDOVENOUS RF 1ST VEIN: CPT | Mod: LT | Performed by: SURGERY

## 2022-11-25 PROCEDURE — 36476 ENDOVENOUS RF VEIN ADD-ON: CPT | Mod: LT | Performed by: SURGERY

## 2022-11-25 PROCEDURE — 37765 STAB PHLEB VEINS XTR 10-20: CPT | Mod: LT | Performed by: SURGERY

## 2022-11-25 PROCEDURE — 37799 UNLISTED PX VASCULAR SURGERY: CPT | Performed by: SURGERY

## 2022-11-25 PROCEDURE — 36468 NJX SCLRSNT SPIDER VEINS: CPT | Performed by: SURGERY

## 2022-11-25 PROCEDURE — S9999 SALES TAX: HCPCS | Performed by: SURGERY

## 2022-11-25 RX ORDER — OXYCODONE HYDROCHLORIDE 5 MG/1
5 TABLET ORAL EVERY 6 HOURS PRN
Qty: 6 TABLET | Refills: 0 | Status: SHIPPED | OUTPATIENT
Start: 2022-11-25 | End: 2022-11-28

## 2022-11-25 NOTE — PROGRESS NOTES
Pre-procedure Nursing Note    Farideh Sr presents to clinic for Vein Procedure  .   /Person Responsible for Patient: Erik (Son)  Phone Number: [phone]    Prophylactic Medication:N/A   Sedation Medication: Ativan, 3mg ,   Time Taken: 0643 and Clonidine, 0.1mg,   Time Taken: 0643  Compression Stockings: Patient brought with today.  The procedure is being performed on BLE.  Patient understanding of procedure matches consent? YES    Patient's pre-procedure medications verified by DOUGIE Baltazar.    Delaney Cai RN on 11/25/2022 at 6:47 AM

## 2022-11-25 NOTE — PROGRESS NOTES
Vein Clinic Procedure Note    Preoperative diagnosis:    1.  Painful left thigh/calf varicose veins  2.  Incompetent left greater saphenous vein  3.  Incompetent left accessory saphenous vein  4.  Bilateral cosmetic spider veins    Post operative diagnosis:  Same    Procedure:  1.  Radiofrequency ablation left greater saphenous vein from saphenofemoral junction ankle  2.  Radiofrequency ablation left accessory saphenous vein  3.   Extensive cosmetic stab phlebectomies left thigh/calf varicose veins  4.   Bilateral sclerotherapy--primarily right and left calfs    Preoperative medications: 3 mg ativan, 0.1 mg clonidine      Venus Closure    Date/Time: 11/25/2022 9:20 AM  Performed by: Champ Wilder MD  Authorized by: Champ Wilder MD     Time out: Immediately prior to the procedure a time out was called    Preparation: Patient was prepped and draped in usual sterile fashion    1st Assist: Antonina Pena CST/CASTRO    Circulator: Delaney Cai RN    Procedure:  VNUS  Procedure side:  Left  One Vein    Second and Subsequent Vein    Vein Treated:  GSV and ASV  Patient tolerance:  Patient tolerated the procedure well with no immediate complications  Wrap/Hose:  Wraps  Phlebectomy    Date/Time: 11/25/2022 9:21 AM  Performed by: Champ Wilder MD  Authorized by: Champ Wilder MD     Procedure:  Phlebectomies  Type:  Cosmetic  Procedure side:  Left  Session:  Full  Sclerotherapy    Date/Time: 11/25/2022 9:21 AM  Performed by: Champ Wilder MD  Authorized by: Champ Wilder MD     Type:  Cosmetic  Session:  Full  Procedure side:  Bilateral  Solution/Amount:  .5 POLIDOCANOL  Syringes:  10        Operative description  Indications: 52-year-old patient's had longstanding varicosities.  She has but a very dilated painful varicosity left thigh due to incompetence of the accessory saphenous vein.  Also extensive varicosities in the medial calf including the tibial  region and also several in the posterior calf.  Segmental incompetence of the greater saphenous vein but particularly at the knee and calf where there is marked reflux feeding into the varicosities.  She has failed conservative treatment.  We decided to proceed with closure of the entire greater saphenous vein aware that she may have numbness along the greater saphenous nerve distribution.  We need to do this in the calf due to the multiple varicosities originating from these incompetent segments.  Also plan closure of the accessory saphenous vein along with extensive stab phlebectomies.  She also has cosmetic dense spider veins particular in the calf region right and left and several in the left popliteal region.    With the patient standing the surface varicosities were marked.  Risk benefits reviewed with the procedure but also sclerotherapy.    Procedure: Timeouts were called and sites identified.    SCLEROTHERAPY: Initially started with the sclerotherapy.  We used the [employer] polarized light system with magnification.  30-gauge needle was used.  Using undiluted 0.5% polidocanol we injected extensive spider veins primarily on the calf region but also in the left popliteal region with very good results.  We used a total of 10 syringes with good visualization effect and no evidence of complications.    VNUS: Then brought to the procedure room.  The entire left leg and groin were prepped and draped.  Timeout was called the sites were identified.  Placed within reverse Trendelenburg.  Identified the greater saphenous vein is a single channel from the groin down to the ankle giving out multiple varicosities in the distal thigh/calf and closer to the ankle.  Also identified the accessory saphenous vein going to the thigh varicosities.    We anesthetized the ankle and under ultrasound guidance entered the ankle greater saphenous vein very easily.  This was followed by a guidewire and a 7 Romansh sheath.  This was  irrigated with saline.  A closure fast catheter was selected and flushed and passed with some mild manipulation at a tortuous area in the proximal one third of the calf but able to go up to the saphenofemoral junction.    With her still in reverse Trendelenburg we accessed in the proximal one third at 5 the dilated incompetent accessory saphenous vein leaving the guidewire in place.    GSV CLOSURE: Tip of the catheter was secured at 2.3 cm from the junction.  Placed within Trendelenburg with the tip of the catheter was confirmed and documented.  Tumescent solution injected under ultrasound guidance initially from the knee to the groin and then from the ankle to the knee.  In the calf we had a minimum of 1 cm between the calf and the dermis.  We began our treatment sessions.  Each session lasted for 20 seconds at 120  C.  The first 3 segments required dual treatments with good impedance.  Ultrasound pressure was held over all treatment segments with good visualization the fact and no discomfort to the patient.  In the distal thigh and proximal calf a dual treatment was not necessary but the rest required single treatments with good impedance.  Sheath and catheter removed.    ASV CLOSURE: With the patient still in Trendelenburg the sheath was passed over the previously placed guidewire.  Closure fast catheter was passed through the sheath towards the saphenofemoral junction.  We made sure that we had a minimum of 2 cm from the junction but difficult to documented due to the tumescent solution from the GSV treatment.  Again under ultrasound guidance tumescent solution was injected circumferentially with a minimum of 1 cm between the cath and the dermis.  We began our treatment sessions with the first 2 requiring a dual treatment and the final segment of single treatment with good impedance.  Sheath and catheter removed.      Documented patency of the common femoral vein at the saphenofemoral junction.  Stab  phlebectomy: We then anesthetize all marked areas of the varicosities.  Using a micro ophthalmic blade we made a total of 39 stepladder incisions.  Vein hooks the marked veins in their entirety were removed with very minimal bleeding and discomfort.    Vaseline ointment was placed over all sites followed by ABD pads/cast roll/Ace bandage from the toes to the groin.    Overall patient was very comfortable with entire procedure though we did need to inject in several areas additional tumescent solution.  She recovered in our suites and discharged to home with her family postoperative instructions and follow-up in 72 hours.    EBL: Less than 10 mL    Flowsheet Data 2022   Procedure Start Time:  7:54 AM   Prep: Chloraprep   Side: Left   Tx Length (cm): LEFT GSV: 84   Junction (cm): LEFT GSV: 2.39   RF Cycles: LEFT GSV: 19   RF TX Time (Minutes): LEFT GSV: 6:04   How many additional vein treatments are being performed? 1   Tx Length (cm) - 2: LEFT ASV: 23   Junction 2 (cm): LEFT ASV: -   RF Cycles 2 : LEFT ASV: 5   RF TX Time (Minutes) 2: LEFT ASV: 1:40   # PHLEB Sites: LEFT LE   Sedation taken: Yes   Pre Pt. Physical / Cognitive Limitations: WNL   TOTAL Local anesthesia Injected (ml): 7   Max Volume Local Anesthesia (ml): 11   TOTAL Tumescent Injected volume (ml): 650   Max Volume Tumescent (ml): 858   Post Pt. Physical / Cognitive Limitations: WNL   Procedure End Time:  9:07 AM   D/C Instructions given, states readiness to leave and escorted to car: Yes       Patient's blood pressure, pulse and pulse oximetry were continuously monitored throughout the procedure under my direct supervision and was stable during the procedure.    Patient recovered in our suites and discharged home with their family with postoperative instructions and follow up.     Champ Wilder MD

## 2022-11-25 NOTE — LETTER
11/25/2022         RE: Farideh Sr  [id number]  [place] [address]        Dear Colleague,    Thank you for referring your patient, Farideh Sr, to the [facility] [employer]. Please see a copy of my visit note below.    Pre-procedure Nursing Note    Farideh Sr presents to clinic for Vein Procedure  .   /Person Responsible for Patient: Erik (Son)  Phone Number: [phone]    Prophylactic Medication:N/A   Sedation Medication: Ativan, 3mg ,   Time Taken: 0643 and Clonidine, 0.1mg,   Time Taken: 0643  Compression Stockings: Patient brought with today.  The procedure is being performed on BLE.  Patient understanding of procedure matches consent? YES    Patient's pre-procedure medications verified by DOUGIE Baltazar.    Delaney Cai RN on 11/25/2022 at 6:47 AM        Vein Clinic Procedure Note    Preoperative diagnosis:    1.  Painful left thigh/calf varicose veins  2.  Incompetent left greater saphenous vein  3.  Incompetent left accessory saphenous vein  4.  Bilateral cosmetic spider veins    Post operative diagnosis:  Same    Procedure:  1.  Radiofrequency ablation left greater saphenous vein from saphenofemoral junction ankle  2.  Radiofrequency ablation left accessory saphenous vein  3.   Extensive cosmetic stab phlebectomies left thigh/calf varicose veins  4.   Bilateral sclerotherapy--primarily right and left calfs    Preoperative medications: 3 mg ativan, 0.1 mg clonidine      Venus Closure    Date/Time: 11/25/2022 9:20 AM  Performed by: Champ Wilder MD  Authorized by: Champ Wilder MD     Time out: Immediately prior to the procedure a time out was called    Preparation: Patient was prepped and draped in usual sterile fashion    1st Assist: Antonina Pena CST/CSFA    Circulator: Delaney Cai RN    Procedure:  VNUS  Procedure side:  Left  One Vein    Second and Subsequent Vein    Vein Treated:  GSV and ASV  Patient tolerance:   Patient tolerated the procedure well with no immediate complications  Wrap/Hose:  Wraps  Phlebectomy    Date/Time: 11/25/2022 9:21 AM  Performed by: Champ Wilder MD  Authorized by: Champ Wilder MD     Procedure:  Phlebectomies  Type:  Cosmetic  Procedure side:  Left  Session:  Full  Sclerotherapy    Date/Time: 11/25/2022 9:21 AM  Performed by: Champ Wilder MD  Authorized by: Champ Wilder MD     Type:  Cosmetic  Session:  Full  Procedure side:  Bilateral  Solution/Amount:  .5 POLIDOCANOL  Syringes:  10        Operative description  Indications: 52-year-old patient's had longstanding varicosities.  She has but a very dilated painful varicosity left thigh due to incompetence of the accessory saphenous vein.  Also extensive varicosities in the medial calf including the tibial region and also several in the posterior calf.  Segmental incompetence of the greater saphenous vein but particularly at the knee and calf where there is marked reflux feeding into the varicosities.  She has failed conservative treatment.  We decided to proceed with closure of the entire greater saphenous vein aware that she may have numbness along the greater saphenous nerve distribution.  We need to do this in the calf due to the multiple varicosities originating from these incompetent segments.  Also plan closure of the accessory saphenous vein along with extensive stab phlebectomies.  She also has cosmetic dense spider veins particular in the calf region right and left and several in the left popliteal region.    With the patient standing the surface varicosities were marked.  Risk benefits reviewed with the procedure but also sclerotherapy.    Procedure: Timeouts were called and sites identified.    SCLEROTHERAPY: Initially started with the sclerotherapy.  We used the [employer] polarized light system with magnification.  30-gauge needle was used.  Using undiluted 0.5% polidocanol we injected extensive  spider veins primarily on the calf region but also in the left popliteal region with very good results.  We used a total of 10 syringes with good visualization effect and no evidence of complications.    VNUS: Then brought to the procedure room.  The entire left leg and groin were prepped and draped.  Timeout was called the sites were identified.  Placed within reverse Trendelenburg.  Identified the greater saphenous vein is a single channel from the groin down to the ankle giving out multiple varicosities in the distal thigh/calf and closer to the ankle.  Also identified the accessory saphenous vein going to the thigh varicosities.    We anesthetized the ankle and under ultrasound guidance entered the ankle greater saphenous vein very easily.  This was followed by a guidewire and a 7 Bengali sheath.  This was irrigated with saline.  A closure fast catheter was selected and flushed and passed with some mild manipulation at a tortuous area in the proximal one third of the calf but able to go up to the saphenofemoral junction.    With her still in reverse Trendelenburg we accessed in the proximal one third at 5 the dilated incompetent accessory saphenous vein leaving the guidewire in place.    GSV CLOSURE: Tip of the catheter was secured at 2.3 cm from the junction.  Placed within Trendelenburg with the tip of the catheter was confirmed and documented.  Tumescent solution injected under ultrasound guidance initially from the knee to the groin and then from the ankle to the knee.  In the calf we had a minimum of 1 cm between the calf and the dermis.  We began our treatment sessions.  Each session lasted for 20 seconds at 120  C.  The first 3 segments required dual treatments with good impedance.  Ultrasound pressure was held over all treatment segments with good visualization the fact and no discomfort to the patient.  In the distal thigh and proximal calf a dual treatment was not necessary but the rest required single  treatments with good impedance.  Sheath and catheter removed.    ASV CLOSURE: With the patient still in Trendelenburg the sheath was passed over the previously placed guidewire.  Closure fast catheter was passed through the sheath towards the saphenofemoral junction.  We made sure that we had a minimum of 2 cm from the junction but difficult to documented due to the tumescent solution from the GSV treatment.  Again under ultrasound guidance tumescent solution was injected circumferentially with a minimum of 1 cm between the cath and the dermis.  We began our treatment sessions with the first 2 requiring a dual treatment and the final segment of single treatment with good impedance.  Sheath and catheter removed.      Documented patency of the common femoral vein at the saphenofemoral junction.  Stab phlebectomy: We then anesthetize all marked areas of the varicosities.  Using a micro ophthalmic blade we made a total of 39 stepladder incisions.  Vein hooks the marked veins in their entirety were removed with very minimal bleeding and discomfort.    Vaseline ointment was placed over all sites followed by ABD pads/cast roll/Ace bandage from the toes to the groin.    Overall patient was very comfortable with entire procedure though we did need to inject in several areas additional tumescent solution.  She recovered in our suites and discharged to home with her family postoperative instructions and follow-up in 72 hours.    EBL: Less than 10 mL    Flowsheet Data 2022   Procedure Start Time:  7:54 AM   Prep: Chloraprep   Side: Left   Tx Length (cm): LEFT GSV: 84   Junction (cm): LEFT GSV: 2.39   RF Cycles: LEFT GSV: 19   RF TX Time (Minutes): LEFT GSV: 6:04   How many additional vein treatments are being performed? 1   Tx Length (cm) - 2: LEFT ASV: 23   Junction 2 (cm): LEFT ASV: -   RF Cycles 2 : LEFT ASV: 5   RF TX Time (Minutes) 2: LEFT ASV: 1:40   # PHLEB Sites: LEFT LE   Sedation taken: Yes   Pre Pt.  Physical / Cognitive Limitations: WNL   TOTAL Local anesthesia Injected (ml): 7   Max Volume Local Anesthesia (ml): 11   TOTAL Tumescent Injected volume (ml): 650   Max Volume Tumescent (ml): 858   Post Pt. Physical / Cognitive Limitations: WNL   Procedure End Time:  9:07 AM   D/C Instructions given, states readiness to leave and escorted to car: Yes       Patient's blood pressure, pulse and pulse oximetry were continuously monitored throughout the procedure under my direct supervision and was stable during the procedure.    Patient recovered in our suites and discharged home with their family with postoperative instructions and follow up.     Champ Wilder MD      Again, thank you for allowing me to participate in the care of your patient.        Sincerely,        Champ Wilder MD

## 2022-11-27 ENCOUNTER — TELEPHONE (OUTPATIENT)
Dept: OTHER | Facility: CLINIC | Age: 52
End: 2022-11-27

## 2022-11-27 NOTE — TELEPHONE ENCOUNTER
[facility]: Joanne       I called Farideh Sr about her vein procedure on 11/25/2022.  I left a message.  She has a follow-up tomorrow.    Champ Wilder MD

## 2022-11-28 ENCOUNTER — ANCILLARY PROCEDURE (OUTPATIENT)
Dept: ULTRASOUND IMAGING | Facility: CLINIC | Age: 52
End: 2022-11-28
Attending: SURGERY
Payer: COMMERCIAL

## 2022-11-28 ENCOUNTER — OFFICE VISIT (OUTPATIENT)
Dept: VASCULAR SURGERY | Facility: CLINIC | Age: 52
End: 2022-11-28
Attending: SURGERY
Payer: COMMERCIAL

## 2022-11-28 DIAGNOSIS — I83.812 VARICOSE VEINS OF LEFT LOWER EXTREMITY WITH PAIN: ICD-10-CM

## 2022-11-28 DIAGNOSIS — Z09 POSTOP CHECK: Primary | ICD-10-CM

## 2022-11-28 PROCEDURE — 93971 EXTREMITY STUDY: CPT | Mod: LT | Performed by: SURGERY

## 2022-11-28 PROCEDURE — 99207 PR NO CHARGE NURSE ONLY: CPT

## 2022-11-28 NOTE — PATIENT INSTRUCTIONS
Vein Closure (Ablation)  Common Things to Expect    Small lumps may develop beneath phlebectomy sites and the site where the vein closure device was inserted.  This is a normal step in healing.  These should not be painful, but may be tender to the touch. It can take 6 weeks to 3 months for these lumps/firmness to resolve.   Bruising will look worse before it looks better and can last for 4-6 weeks.  After about 10 days, you may notice tightness/pulling on the inside thigh and knee. As your ablated vein is healing, it contracts, causing a tightness or pulling sensation. This may last for several weeks, but will resolve. Treat it with Ibuprofen or Advil.  Numbness will get better with time, but may take 3 months to a year to resolve.  You may notice that the skin on your legs has become ultra-sensitive to touch. For example, the weight of your sheets may feel painful. This usually resolves in 6 weeks.  Ankle swelling is not uncommon and may last 4-6 weeks.   To get optimal results from your procedure, wearing your compression hose is key for the first 7 days. This is necessary to ensure proper closure of the ablated vein.  For 2 weeks, no weight lifting over 25lbs, no running, and no vigorous aerobic exercise. After this time, ease back into your normal activities. If you do too much too soon, you will have more pain and bruising and possibly re-open the vein that was closed. It takes about 2 weeks for the ablated vein to permanently close. Keep in mind your body is still healing.  For 2 weeks, do not shave your legs or use lotions, powders, creams to allow proper healing of phlebectomy sites and vein access sites.    Vein Removal (Phlebectomy)  Common Things to Expect     Small lumps may develop beneath phlebectomy sites and the site where the vein closure device was inserted. This is a normal step in healing.  These should not be painful, but may be tender to the touch. It can take 6 weeks to 3 months for these  lumps/firmness to resolve.  Bruising will look worse before it looks better and can last for 4-6 weeks.  Ankle swelling is not uncommon and may last 4-6 weeks.       If you are experiencing any of the following symptoms, please seek immediate medical attention at your local emergency department.  - Significant pain in the back of the calf possibly with difficulty walking  - Significant swelling and/or tenderness in the back of the calf  - Redness that continues to spread  - Chest pain and/or shortness of breath     SCLEROTHERAPY AFTER CARE  Immediately:  After treatment, walk for 10-15 minutes before getting in your car.  If your trip home is more than 1 hour, stop and walk around for 5-10 minutes. Avoid sitting or standing for extended periods.   First 24 hours: Wear your compression continuously, even while in bed. After the 24 hours, you may shower if you want to. Put your hose back on, unless you are going to bed. You should NOT wear compression to bed after the first 24 hours. You may fly the next day, but wear your compression.   For 5 days: Wear the compression hose for waking hours only. You may continue to wear them longer than 5 days if you prefer.   For days 5-7: Walking is encouraged, as it promotes efficient circulation in your veins. You may do activities that raise your heart rate, but do NOT run, jog, do high impact aerobics, or weight lifting. After 7 days, no activity restrictions.    Shaving: Wait a few days to shave or apply lotion.   Bathing: Do NOT take hot baths or sit in a hot tub for 7-10 days.    For 1 year: Wear SPF 30 sunscreen on your legs when in the sun. This is very important! It helps prevent darkening of the skin at the injection sites.   Medications: You may resume your usual medications, including aspirin or ibuprofen.    Common Things to Expect  -  Compression must be worn for the first 24 hours and then during the day for 5-7 days.    -  If larger veins are treated with  ultrasound-guided sclerotherapy, you will have redness, firmness, tenderness, and swelling.  This firmness and tenderness may take 3-6 months to resolve. Ibuprofen and compression hose will aid in this process.    -  You will have bruising that can last up to 3 weeks. Most fading of the veins will occur between 3 and 6 weeks after treatment.    -  You may notice brown discoloration (hyperpigmentation) at the treatment site.  This should fade with time, but will take 3 months to 1 year to fully heal.     -  Some treated veins may look darker because of trapped blood within the vein. This trapped blood can be removed at a minimum of 1 month following treatment. Larger veins are more likely to develop trapped blood.    -  It is very important for you to use at least SPF 30 sunscreen in order to help prevent the discoloration of your skin.    -  Migraines rarely occur following sclerotherapy, but are more likely in patients with a history of migraines.  Treat as you would any other migraine.

## 2022-11-28 NOTE — LETTER
"    11/28/2022         RE: Farideh Sr  [address]  [place] [address]        Dear Colleague,    Thank you for referring your patient, Farideh Sr, to the [facility] [place]. Please see a copy of my visit note below.      November 28, 2022    Vein Clinic Postoperative Nurse Note    Patient is here for her 72 hour postoperative visit.    Procedure: Left leg VNUS closure GSV, ASV(med nec), 1unit phlebs($) and bilateral leg sclero ($)  Procedure Date: 11/25/22  Surgeon: Dr. Wilder    Ultrasound Result: The left lower extremity is negative for a DVT. The left GSV is closed 21.3 mm from the SFJ to the distal calf. The left AASV is closed 28.8 mm from the SFJ to the mid thigh.     Physical Exam: Incisions are approximated without signs of infection.  Ecchymosis: Moderate bruising noted to the left lower extremity.   Swelling: Minimal swelling noted to the left lower extremity.  Paresthesia: Patient denies numbness to the left lower extremity.    Patient Questions or Concerns: Patient stating that the thigh high compression hose kept sliding down thigh and it was bothering her. Patient had the correct size. Assisted patient in applying ACE wrap to the upper thigh to help keep compression hose up.     Patient also has an area of concern to the left lateral lower leg of what appears to be darker spider veins. Explained to the patient that as her leg heals this may improve but to readdress with Dr. Wilder at the 6 week follow up.     Patient also mentioned that she was very \"out of it\" the entire rest of the day on Friday and most of the day Saturday. I reassured patient that she did get the appropriate sedation on the day of procedure (3 mg Ativan, 0.1mg clonidine), but if she were to have a future procedure we would do a lesser dose.     Reviewed postoperative instructions with patient and provided her with written material of common things to expect from her procedure.    Patient's " Next Vein Clinic Appointment: 1/9/23 6 week post op follow up with Dr. Meño Gamboa, RN  [facility]      Again, thank you for allowing me to participate in the care of your patient.        Sincerely,         Vein Nurse

## 2022-11-28 NOTE — PROGRESS NOTES
"  November 28, 2022    Vein Clinic Postoperative Nurse Note    Patient is here for her 72 hour postoperative visit.    Procedure: Left leg VNUS closure GSV, ASV(med nec), 1unit phlebs($) and bilateral leg sclero ($)  Procedure Date: 11/25/22  Surgeon: Dr. Wilder    Ultrasound Result: The left lower extremity is negative for a DVT. The left GSV is closed 21.3 mm from the SFJ to the distal calf. The left AASV is closed 28.8 mm from the SFJ to the mid thigh.     Physical Exam: Incisions are approximated without signs of infection.  Ecchymosis: Moderate bruising noted to the left lower extremity.   Swelling: Minimal swelling noted to the left lower extremity.  Paresthesia: Patient denies numbness to the left lower extremity.    Patient Questions or Concerns: Patient stating that the thigh high compression hose kept sliding down thigh and it was bothering her. Patient had the correct size. Assisted patient in applying ACE wrap to the upper thigh to help keep compression hose up.     Patient also has an area of concern to the left lateral lower leg of what appears to be darker spider veins. Explained to the patient that as her leg heals this may improve but to readdress with Dr. Wilder at the 6 week follow up.     Patient also mentioned that she was very \"out of it\" the entire rest of the day on Friday and most of the day Saturday. I reassured patient that she did get the appropriate sedation on the day of procedure (3 mg Ativan, 0.1mg clonidine), but if she were to have a future procedure we would do a lesser dose.     Reviewed postoperative instructions with patient and provided her with written material of common things to expect from her procedure.    Patient's Next Vein Clinic Appointment: 1/9/23 6 week post op follow up with Dr. Meño Gamboa RN  [facility]  "

## 2023-02-13 ENCOUNTER — OFFICE VISIT (OUTPATIENT)
Dept: VASCULAR SURGERY | Facility: CLINIC | Age: 53
End: 2023-02-13
Payer: COMMERCIAL

## 2023-02-13 DIAGNOSIS — I83.93 SPIDER VEINS OF BOTH LOWER EXTREMITIES: ICD-10-CM

## 2023-02-13 DIAGNOSIS — I83.812 VARICOSE VEINS OF LEG WITH PAIN, LEFT: Primary | ICD-10-CM

## 2023-02-13 PROCEDURE — 36468 NJX SCLRSNT SPIDER VEINS: CPT | Performed by: SURGERY

## 2023-02-13 PROCEDURE — S9999 SALES TAX: HCPCS | Performed by: SURGERY

## 2023-02-13 RX ORDER — PREDNISONE 5 MG/1
TABLET ORAL
COMMUNITY
Start: 2023-02-06

## 2023-02-13 NOTE — LETTER
2/13/2023         RE: Farideh Sr  [address]  [place] [address]        Dear Colleague,    Thank you for referring your patient, Farideh Sr, to the [facility] [place]. Please see a copy of my visit note below.     [facility]: Joanne Sr returns for 6-week follow-up.  Longtime painful varicosities.  11/25/2022.  Ablation of the entire GSV down the ankle along with the accessory saphenous vein and vein.  Patient's extensive stab phlebectomies and bilateral sclerotherapy performed.    72-hour follow-up GSV closed 21.3 mm from the junction.  ASV closed 20.8 mm.  Noted below neuropathy.    She has been extremely pleased with the results of her surgery.  Much less pain and no swelling.  Use compression per protocol for 2 weeks following the closure but has not required this since.    She does have ongoing multiple spider veins.  Somewhat more prominent in the left mid anterior lateral thigh and both posterior proximal calfs.  She is interested in repeat sclerotherapy with the risks and benefits being reviewed.    Vein Clinic Sclerotherapy Note     Farideh Sr is a 52 year old female who was seen in clinic today for Sclerotherapy.    Sclerotherapy    Date/Time: 2/13/2023 3:25 PM  Performed by: Champ Wilder MD  Authorized by: Champ Wilder MD     Circulator: Delaney Cai RN     Type:  Cosmetic  Session:  Limited  Procedure side:  Bilateral  Solution/Amount:  .5% STD  Syringes:  8        Again reviewed sclerotherapy which she has done well in the past with no complications.  Using the [employer] magnified system and a 30-gauge needle I proceeded to inject most of the visible's spider veins using 8 syringes of undiluted polidocanol.  Good initial results.    She left her compression at home but will put this on so she gets home and wear this overnight and then daily for the next 4 to 5 days.    One half treatment session  today    Follow-up 6-month duplex per protocol    Champ Wilder MD    Dictated using Dragon voice recognition software which may result in transcription errors      Again, thank you for allowing me to participate in the care of your patient.        Sincerely,        Champ Wilder MD

## 2023-02-13 NOTE — PROGRESS NOTES
[facility]: Joanne    Farideh Sr returns for 6-week follow-up.  Longtime painful varicosities.  11/25/2022.  Ablation of the entire GSV down the ankle along with the accessory saphenous vein and vein.  Patient's extensive stab phlebectomies and bilateral sclerotherapy performed.    72-hour follow-up GSV closed 21.3 mm from the junction.  ASV closed 20.8 mm.  Noted below neuropathy.    She has been extremely pleased with the results of her surgery.  Much less pain and no swelling.  Use compression per protocol for 2 weeks following the closure but has not required this since.    She does have ongoing multiple spider veins.  Somewhat more prominent in the left mid anterior lateral thigh and both posterior proximal calfs.  She is interested in repeat sclerotherapy with the risks and benefits being reviewed.    [facility] Clinic Sclerotherapy Note     Farideh Sr is a 52 year old female who was seen in clinic today for Sclerotherapy.    Sclerotherapy    Date/Time: 2/13/2023 3:25 PM  Performed by: Champ Wilder MD  Authorized by: Champ Wilder MD     Circulator: Delaney Cai RN     Type:  Cosmetic  Session:  Limited  Procedure side:  Bilateral  Solution/Amount:  .5% STD  Syringes:  8        Again reviewed sclerotherapy which she has done well in the past with no complications.  Using the [employer] polarized magnified system and a 30-gauge needle I proceeded to inject most of the visible's spider veins using 8 syringes of undiluted polidocanol.  Good initial results.    She left her compression at home but will put this on so she gets home and wear this overnight and then daily for the next 4 to 5 days.    One half treatment session today    Follow-up 6-month duplex per protocol    Champ Wilder MD    Dictated using Dragon voice recognition software which may result in transcription errors

## (undated) DEVICE — BLADE SHAVER ARTHRO 4.2MM ULTRACUT C9405A

## (undated) DEVICE — GLOVE PROTEXIS W/NEU-THERA 7.5  2D73TE75

## (undated) DEVICE — LINEN TOWEL PACK X5 5464

## (undated) DEVICE — SOL WATER IRRIG 1000ML BOTTLE 2F7114

## (undated) DEVICE — GLOVE PROTEXIS MICRO 8.5  2D73PM85

## (undated) DEVICE — ESU PENCIL W/HOLSTER

## (undated) DEVICE — GOWN XXLG REINFORCED 9071EL

## (undated) DEVICE — SOL NACL 0.9% IRRIG 1000ML BOTTLE 07138-09

## (undated) DEVICE — PACK EXTREMITY SOP15EXFSD

## (undated) DEVICE — PREP DURAPREP 26ML APL 8630

## (undated) DEVICE — GLOVE PROTEXIS W/NEU-THERA 8.0  2D73TE80

## (undated) DEVICE — SU ETHILON 3-0 FS-1 18" 669H

## (undated) DEVICE — IMM LIMB ELEVATOR DC40-0203

## (undated) DEVICE — BNDG ELASTIC 4"X5YDS STERILE 6611-4S

## (undated) DEVICE — TUBING ARTHRO CONMED/LINVATEC PUMP BLUE INFLOW 10K100

## (undated) DEVICE — GLOVE PROTEXIS W/NEU-THERA 8.5  2D73TE85

## (undated) DEVICE — SOL NACL 0.9% IRRIG 3000ML BAG 2B7477

## (undated) RX ORDER — FENTANYL CITRATE 50 UG/ML
INJECTION, SOLUTION INTRAMUSCULAR; INTRAVENOUS
Status: DISPENSED
Start: 2018-09-17

## (undated) RX ORDER — LIDOCAINE HYDROCHLORIDE 20 MG/ML
INJECTION, SOLUTION EPIDURAL; INFILTRATION; INTRACAUDAL; PERINEURAL
Status: DISPENSED
Start: 2018-09-17

## (undated) RX ORDER — CEFAZOLIN SODIUM 2 G/100ML
INJECTION, SOLUTION INTRAVENOUS
Status: DISPENSED
Start: 2018-09-17

## (undated) RX ORDER — KETOROLAC TROMETHAMINE 30 MG/ML
INJECTION, SOLUTION INTRAMUSCULAR; INTRAVENOUS
Status: DISPENSED
Start: 2018-09-17

## (undated) RX ORDER — OXYCODONE AND ACETAMINOPHEN 5; 325 MG/1; MG/1
TABLET ORAL
Status: DISPENSED
Start: 2018-09-17

## (undated) RX ORDER — PROPOFOL 10 MG/ML
INJECTION, EMULSION INTRAVENOUS
Status: DISPENSED
Start: 2018-09-17

## (undated) RX ORDER — ONDANSETRON 2 MG/ML
INJECTION INTRAMUSCULAR; INTRAVENOUS
Status: DISPENSED
Start: 2018-09-17

## (undated) RX ORDER — DEXAMETHASONE SODIUM PHOSPHATE 4 MG/ML
INJECTION, SOLUTION INTRA-ARTICULAR; INTRALESIONAL; INTRAMUSCULAR; INTRAVENOUS; SOFT TISSUE
Status: DISPENSED
Start: 2018-09-17